# Patient Record
Sex: MALE | Race: WHITE | Employment: UNEMPLOYED | URBAN - METROPOLITAN AREA
[De-identification: names, ages, dates, MRNs, and addresses within clinical notes are randomized per-mention and may not be internally consistent; named-entity substitution may affect disease eponyms.]

---

## 2018-05-07 ENCOUNTER — ANESTHESIA (OUTPATIENT)
Dept: CARDIAC CATH/INVASIVE PROCEDURES | Age: 58
DRG: 247 | End: 2018-05-07
Payer: SELF-PAY

## 2018-05-07 ENCOUNTER — HOSPITAL ENCOUNTER (INPATIENT)
Age: 58
LOS: 3 days | Discharge: HOME | DRG: 247 | End: 2018-05-10
Attending: EMERGENCY MEDICINE | Admitting: HOSPITALIST
Payer: SELF-PAY

## 2018-05-07 ENCOUNTER — ANESTHESIA EVENT (OUTPATIENT)
Dept: CARDIAC CATH/INVASIVE PROCEDURES | Age: 58
DRG: 247 | End: 2018-05-07
Payer: SELF-PAY

## 2018-05-07 DIAGNOSIS — I21.09 ST ELEVATION MYOCARDIAL INFARCTION (STEMI) OF ANTERIOR WALL (HCC): Primary | ICD-10-CM

## 2018-05-07 PROBLEM — I21.3 STEMI (ST ELEVATION MYOCARDIAL INFARCTION) (HCC): Status: ACTIVE | Noted: 2018-05-07

## 2018-05-07 PROBLEM — I21.3 STEMI (ST ELEVATION MYOCARDIAL INFARCTION) (HCC): Chronic | Status: ACTIVE | Noted: 2018-05-07

## 2018-05-07 LAB
ACT BLD: 114 SECS (ref 79–138)
ACT BLD: 158 SECS (ref 79–138)
ACT BLD: 401 SECS (ref 79–138)
ANION GAP BLD CALC-SCNC: 16 MMOL/L (ref 10–20)
ANION GAP SERPL CALC-SCNC: 5 MMOL/L (ref 3–18)
ANION GAP SERPL CALC-SCNC: 5 MMOL/L (ref 3–18)
ARTERIAL PATENCY WRIST A: ABNORMAL
ATRIAL RATE: 88 BPM
ATRIAL RATE: 98 BPM
BASE DEFICIT BLD-SCNC: 2 MMOL/L
BASOPHILS # BLD: 0.2 K/UL (ref 0–0.06)
BASOPHILS NFR BLD: 1 % (ref 0–3)
BDY SITE: ABNORMAL
BUN BLD-MCNC: 17 MG/DL (ref 7–18)
BUN SERPL-MCNC: 16 MG/DL (ref 7–18)
BUN SERPL-MCNC: 16 MG/DL (ref 7–18)
BUN/CREAT SERPL: 16 (ref 12–20)
BUN/CREAT SERPL: 20 (ref 12–20)
CA-I BLD-MCNC: 1.18 MMOL/L (ref 1.12–1.32)
CA-I BLD-MCNC: 1.21 MMOL/L (ref 1.12–1.32)
CALCIUM SERPL-MCNC: 8.4 MG/DL (ref 8.5–10.1)
CALCIUM SERPL-MCNC: 9.1 MG/DL (ref 8.5–10.1)
CALCULATED P AXIS, ECG09: 70 DEGREES
CALCULATED P AXIS, ECG09: 73 DEGREES
CALCULATED R AXIS, ECG10: 23 DEGREES
CALCULATED R AXIS, ECG10: 55 DEGREES
CALCULATED T AXIS, ECG11: 33 DEGREES
CALCULATED T AXIS, ECG11: 69 DEGREES
CHLORIDE BLD-SCNC: 100 MMOL/L (ref 100–108)
CHLORIDE SERPL-SCNC: 106 MMOL/L (ref 100–108)
CHLORIDE SERPL-SCNC: 108 MMOL/L (ref 100–108)
CO2 BLD-SCNC: 28 MMOL/L (ref 19–24)
CO2 SERPL-SCNC: 25 MMOL/L (ref 21–32)
CO2 SERPL-SCNC: 28 MMOL/L (ref 21–32)
CREAT SERPL-MCNC: 0.82 MG/DL (ref 0.6–1.3)
CREAT SERPL-MCNC: 0.98 MG/DL (ref 0.6–1.3)
CREAT UR-MCNC: 1 MG/DL (ref 0.6–1.3)
DIAGNOSIS, 93000: NORMAL
DIAGNOSIS, 93000: NORMAL
DIFFERENTIAL METHOD BLD: ABNORMAL
EOSINOPHIL # BLD: 0.3 K/UL (ref 0–0.4)
EOSINOPHIL NFR BLD: 2 % (ref 0–5)
ERYTHROCYTE [DISTWIDTH] IN BLOOD BY AUTOMATED COUNT: 13.2 % (ref 11.6–14.5)
ERYTHROCYTE [DISTWIDTH] IN BLOOD BY AUTOMATED COUNT: 13.3 % (ref 11.6–14.5)
GAS FLOW.O2 O2 DELIVERY SYS: ABNORMAL L/MIN
GLUCOSE BLD STRIP.AUTO-MCNC: 176 MG/DL (ref 74–106)
GLUCOSE BLD STRIP.AUTO-MCNC: 87 MG/DL (ref 74–106)
GLUCOSE SERPL-MCNC: 147 MG/DL (ref 74–99)
GLUCOSE SERPL-MCNC: 86 MG/DL (ref 74–99)
HCO3 BLD-SCNC: 23.6 MMOL/L (ref 22–26)
HCT VFR BLD AUTO: 39.5 % (ref 36–48)
HCT VFR BLD AUTO: 43.2 % (ref 36–48)
HCT VFR BLD CALC: 39 % (ref 36–49)
HCT VFR BLD CALC: 45 % (ref 36–49)
HGB BLD-MCNC: 13.3 G/DL (ref 12–16)
HGB BLD-MCNC: 14 G/DL (ref 13–16)
HGB BLD-MCNC: 15.3 G/DL (ref 12–16)
HGB BLD-MCNC: 15.3 G/DL (ref 13–16)
LYMPHOCYTES # BLD: 5.6 K/UL (ref 0.8–3.5)
LYMPHOCYTES NFR BLD: 33 % (ref 20–51)
MCH RBC QN AUTO: 29.9 PG (ref 24–34)
MCH RBC QN AUTO: 30.4 PG (ref 24–34)
MCHC RBC AUTO-ENTMCNC: 35.4 G/DL (ref 31–37)
MCHC RBC AUTO-ENTMCNC: 35.4 G/DL (ref 31–37)
MCV RBC AUTO: 84.2 FL (ref 74–97)
MCV RBC AUTO: 85.9 FL (ref 74–97)
MONOCYTES # BLD: 2.6 K/UL (ref 0–1)
MONOCYTES NFR BLD: 15 % (ref 2–9)
NEUTS SEG # BLD: 8.4 K/UL (ref 1.8–8)
NEUTS SEG NFR BLD: 49 % (ref 42–75)
O2/TOTAL GAS SETTING VFR VENT: 40 %
P-R INTERVAL, ECG05: 126 MS
P-R INTERVAL, ECG05: 144 MS
PCO2 BLD: 42.4 MMHG (ref 35–45)
PEEP RESPIRATORY: 5 CMH2O
PH BLD: 7.35 [PH] (ref 7.35–7.45)
PLATELET # BLD AUTO: 265 K/UL (ref 135–420)
PLATELET # BLD AUTO: 280 K/UL (ref 135–420)
PLATELET COMMENTS,PCOM: ABNORMAL
PMV BLD AUTO: 10.7 FL (ref 9.2–11.8)
PMV BLD AUTO: 10.9 FL (ref 9.2–11.8)
PO2 BLD: 70 MMHG (ref 80–100)
POTASSIUM BLD-SCNC: 3.2 MMOL/L (ref 3.5–5.5)
POTASSIUM BLD-SCNC: 3.9 MMOL/L (ref 3.5–5.5)
POTASSIUM SERPL-SCNC: 3.2 MMOL/L (ref 3.5–5.5)
POTASSIUM SERPL-SCNC: 3.7 MMOL/L (ref 3.5–5.5)
PRESSURE SUPPORT SETTING VENT: 7 CMH2O
Q-T INTERVAL, ECG07: 302 MS
Q-T INTERVAL, ECG07: 360 MS
QRS DURATION, ECG06: 76 MS
QRS DURATION, ECG06: 90 MS
QTC CALCULATION (BEZET), ECG08: 385 MS
QTC CALCULATION (BEZET), ECG08: 435 MS
RBC # BLD AUTO: 4.69 M/UL (ref 4.7–5.5)
RBC # BLD AUTO: 5.03 M/UL (ref 4.7–5.5)
RBC MORPH BLD: ABNORMAL
SAO2 % BLD: 93 % (ref 92–97)
SERVICE CMNT-IMP: ABNORMAL
SODIUM BLD-SCNC: 137 MMOL/L (ref 136–145)
SODIUM BLD-SCNC: 141 MMOL/L (ref 136–145)
SODIUM SERPL-SCNC: 138 MMOL/L (ref 136–145)
SODIUM SERPL-SCNC: 139 MMOL/L (ref 136–145)
SPECIMEN TYPE: ABNORMAL
TOTAL RESP. RATE, ITRR: 24
TROPONIN I SERPL-MCNC: 146 NG/ML (ref 0–0.04)
TROPONIN I SERPL-MCNC: 3.73 NG/ML (ref 0–0.04)
VENTILATION MODE VENT: ABNORMAL
VENTRICULAR RATE, ECG03: 88 BPM
VENTRICULAR RATE, ECG03: 98 BPM
WBC # BLD AUTO: 15.8 K/UL (ref 4.6–13.2)
WBC # BLD AUTO: 17.1 K/UL (ref 4.6–13.2)

## 2018-05-07 PROCEDURE — 85025 COMPLETE CBC W/AUTO DIFF WBC: CPT | Performed by: EMERGENCY MEDICINE

## 2018-05-07 PROCEDURE — 85027 COMPLETE CBC AUTOMATED: CPT | Performed by: INTERNAL MEDICINE

## 2018-05-07 PROCEDURE — 80048 BASIC METABOLIC PNL TOTAL CA: CPT | Performed by: EMERGENCY MEDICINE

## 2018-05-07 PROCEDURE — 80047 BASIC METABLC PNL IONIZED CA: CPT

## 2018-05-07 PROCEDURE — 99285 EMERGENCY DEPT VISIT HI MDM: CPT

## 2018-05-07 PROCEDURE — B2151ZZ FLUOROSCOPY OF LEFT HEART USING LOW OSMOLAR CONTRAST: ICD-10-PCS | Performed by: INTERNAL MEDICINE

## 2018-05-07 PROCEDURE — 74011250637 HC RX REV CODE- 250/637: Performed by: INTERNAL MEDICINE

## 2018-05-07 PROCEDURE — 0BH17EZ INSERTION OF ENDOTRACHEAL AIRWAY INTO TRACHEA, VIA NATURAL OR ARTIFICIAL OPENING: ICD-10-PCS | Performed by: ANESTHESIOLOGY

## 2018-05-07 PROCEDURE — 82330 ASSAY OF CALCIUM: CPT

## 2018-05-07 PROCEDURE — 027034Z DILATION OF CORONARY ARTERY, ONE ARTERY WITH DRUG-ELUTING INTRALUMINAL DEVICE, PERCUTANEOUS APPROACH: ICD-10-PCS | Performed by: INTERNAL MEDICINE

## 2018-05-07 PROCEDURE — 84484 ASSAY OF TROPONIN QUANT: CPT | Performed by: EMERGENCY MEDICINE

## 2018-05-07 PROCEDURE — 31500 INSERT EMERGENCY AIRWAY: CPT

## 2018-05-07 PROCEDURE — 74011250637 HC RX REV CODE- 250/637: Performed by: HOSPITALIST

## 2018-05-07 PROCEDURE — 93005 ELECTROCARDIOGRAM TRACING: CPT

## 2018-05-07 PROCEDURE — 74011000250 HC RX REV CODE- 250: Performed by: INTERNAL MEDICINE

## 2018-05-07 PROCEDURE — 4A023N7 MEASUREMENT OF CARDIAC SAMPLING AND PRESSURE, LEFT HEART, PERCUTANEOUS APPROACH: ICD-10-PCS | Performed by: INTERNAL MEDICINE

## 2018-05-07 PROCEDURE — 74011250636 HC RX REV CODE- 250/636: Performed by: INTERNAL MEDICINE

## 2018-05-07 PROCEDURE — 85347 COAGULATION TIME ACTIVATED: CPT

## 2018-05-07 PROCEDURE — 74011250636 HC RX REV CODE- 250/636: Performed by: EMERGENCY MEDICINE

## 2018-05-07 PROCEDURE — 74011000250 HC RX REV CODE- 250

## 2018-05-07 PROCEDURE — B2111ZZ FLUOROSCOPY OF MULTIPLE CORONARY ARTERIES USING LOW OSMOLAR CONTRAST: ICD-10-PCS | Performed by: INTERNAL MEDICINE

## 2018-05-07 PROCEDURE — 82803 BLOOD GASES ANY COMBINATION: CPT

## 2018-05-07 PROCEDURE — 94002 VENT MGMT INPAT INIT DAY: CPT

## 2018-05-07 PROCEDURE — 77030013797 CARDIAC CATHETERIZATION

## 2018-05-07 PROCEDURE — 77030008683 HC TU ET CUF COVD -A: Performed by: NURSE ANESTHETIST, CERTIFIED REGISTERED

## 2018-05-07 PROCEDURE — 77030026438 HC STYL ET INTUB CARD -A: Performed by: NURSE ANESTHETIST, CERTIFIED REGISTERED

## 2018-05-07 PROCEDURE — 80048 BASIC METABOLIC PNL TOTAL CA: CPT | Performed by: INTERNAL MEDICINE

## 2018-05-07 PROCEDURE — 65610000006 HC RM INTENSIVE CARE

## 2018-05-07 PROCEDURE — 74011250636 HC RX REV CODE- 250/636

## 2018-05-07 PROCEDURE — 74011636320 HC RX REV CODE- 636/320: Performed by: INTERNAL MEDICINE

## 2018-05-07 PROCEDURE — 5A1935Z RESPIRATORY VENTILATION, LESS THAN 24 CONSECUTIVE HOURS: ICD-10-PCS | Performed by: ANESTHESIOLOGY

## 2018-05-07 PROCEDURE — 36415 COLL VENOUS BLD VENIPUNCTURE: CPT | Performed by: INTERNAL MEDICINE

## 2018-05-07 RX ORDER — NITROGLYCERIN 0.4 MG/1
0.4 TABLET SUBLINGUAL AS NEEDED
Status: DISCONTINUED | OUTPATIENT
Start: 2018-05-07 | End: 2018-05-10 | Stop reason: HOSPADM

## 2018-05-07 RX ORDER — HEPARIN SODIUM 5000 [USP'U]/ML
4000 INJECTION, SOLUTION INTRAVENOUS; SUBCUTANEOUS
Status: ACTIVE | OUTPATIENT
Start: 2018-05-07 | End: 2018-05-07

## 2018-05-07 RX ORDER — SODIUM CHLORIDE 0.9 % (FLUSH) 0.9 %
5-10 SYRINGE (ML) INJECTION EVERY 8 HOURS
Status: DISCONTINUED | OUTPATIENT
Start: 2018-05-07 | End: 2018-05-10 | Stop reason: HOSPADM

## 2018-05-07 RX ORDER — EPTIFIBATIDE 2 MG/ML
INJECTION, SOLUTION INTRAVENOUS
Status: DISPENSED
Start: 2018-05-07 | End: 2018-05-07

## 2018-05-07 RX ORDER — EPTIFIBATIDE 2 MG/ML
180 INJECTION, SOLUTION INTRAVENOUS ONCE
Status: COMPLETED | OUTPATIENT
Start: 2018-05-07 | End: 2018-05-07

## 2018-05-07 RX ORDER — CEFAZOLIN SODIUM 2 G/50ML
2 SOLUTION INTRAVENOUS ONCE
Status: COMPLETED | OUTPATIENT
Start: 2018-05-07 | End: 2018-05-07

## 2018-05-07 RX ORDER — GUAIFENESIN 100 MG/5ML
81 LIQUID (ML) ORAL DAILY
Status: DISCONTINUED | OUTPATIENT
Start: 2018-05-08 | End: 2018-05-10 | Stop reason: HOSPADM

## 2018-05-07 RX ORDER — HEPARIN SODIUM 200 [USP'U]/100ML
1000 INJECTION, SOLUTION INTRAVENOUS ONCE
Status: COMPLETED | OUTPATIENT
Start: 2018-05-07 | End: 2018-05-07

## 2018-05-07 RX ORDER — MORPHINE SULFATE 4 MG/ML
4 INJECTION INTRAVENOUS
Status: ACTIVE | OUTPATIENT
Start: 2018-05-07 | End: 2018-05-07

## 2018-05-07 RX ORDER — ONDANSETRON 2 MG/ML
4 INJECTION INTRAMUSCULAR; INTRAVENOUS ONCE
Status: COMPLETED | OUTPATIENT
Start: 2018-05-07 | End: 2018-05-07

## 2018-05-07 RX ORDER — HEPARIN SODIUM 1000 [USP'U]/ML
1000-10000 INJECTION, SOLUTION INTRAVENOUS; SUBCUTANEOUS
Status: DISCONTINUED | OUTPATIENT
Start: 2018-05-07 | End: 2018-05-07 | Stop reason: HOSPADM

## 2018-05-07 RX ORDER — ACETAMINOPHEN 325 MG/1
650 TABLET ORAL
Status: DISCONTINUED | OUTPATIENT
Start: 2018-05-07 | End: 2018-05-10 | Stop reason: HOSPADM

## 2018-05-07 RX ORDER — SODIUM CHLORIDE 0.9 % (FLUSH) 0.9 %
5-10 SYRINGE (ML) INJECTION AS NEEDED
Status: DISCONTINUED | OUTPATIENT
Start: 2018-05-07 | End: 2018-05-10 | Stop reason: HOSPADM

## 2018-05-07 RX ORDER — MIDAZOLAM HYDROCHLORIDE 1 MG/ML
INJECTION, SOLUTION INTRAMUSCULAR; INTRAVENOUS
Status: COMPLETED
Start: 2018-05-07 | End: 2018-05-07

## 2018-05-07 RX ORDER — METOPROLOL TARTRATE 5 MG/5ML
INJECTION INTRAVENOUS
Status: COMPLETED
Start: 2018-05-07 | End: 2018-05-07

## 2018-05-07 RX ORDER — MIDAZOLAM HYDROCHLORIDE 1 MG/ML
INJECTION, SOLUTION INTRAMUSCULAR; INTRAVENOUS
Status: DISPENSED
Start: 2018-05-07 | End: 2018-05-08

## 2018-05-07 RX ORDER — GUAIFENESIN 100 MG/5ML
LIQUID (ML) ORAL
Status: DISPENSED
Start: 2018-05-07 | End: 2018-05-07

## 2018-05-07 RX ORDER — HEPARIN SODIUM 1000 [USP'U]/ML
INJECTION, SOLUTION INTRAVENOUS; SUBCUTANEOUS
Status: COMPLETED
Start: 2018-05-07 | End: 2018-05-07

## 2018-05-07 RX ORDER — FENTANYL CITRATE 50 UG/ML
25-100 INJECTION, SOLUTION INTRAMUSCULAR; INTRAVENOUS
Status: DISCONTINUED | OUTPATIENT
Start: 2018-05-07 | End: 2018-05-07 | Stop reason: HOSPADM

## 2018-05-07 RX ORDER — MIDAZOLAM HYDROCHLORIDE 1 MG/ML
1-4 INJECTION, SOLUTION INTRAMUSCULAR; INTRAVENOUS
Status: DISCONTINUED | OUTPATIENT
Start: 2018-05-07 | End: 2018-05-07 | Stop reason: HOSPADM

## 2018-05-07 RX ORDER — VERAPAMIL HYDROCHLORIDE 2.5 MG/ML
5 INJECTION, SOLUTION INTRAVENOUS ONCE
Status: DISCONTINUED | OUTPATIENT
Start: 2018-05-07 | End: 2018-05-07 | Stop reason: HOSPADM

## 2018-05-07 RX ORDER — ATORVASTATIN CALCIUM 40 MG/1
80 TABLET, FILM COATED ORAL
Status: DISCONTINUED | OUTPATIENT
Start: 2018-05-07 | End: 2018-05-10 | Stop reason: HOSPADM

## 2018-05-07 RX ORDER — EPTIFIBATIDE 0.75 MG/ML
2 INJECTION, SOLUTION INTRAVENOUS CONTINUOUS
Status: DISCONTINUED | OUTPATIENT
Start: 2018-05-07 | End: 2018-05-07

## 2018-05-07 RX ORDER — ONDANSETRON 2 MG/ML
INJECTION INTRAMUSCULAR; INTRAVENOUS
Status: DISPENSED
Start: 2018-05-07 | End: 2018-05-07

## 2018-05-07 RX ORDER — LIDOCAINE HYDROCHLORIDE 10 MG/ML
1-30 INJECTION, SOLUTION EPIDURAL; INFILTRATION; INTRACAUDAL; PERINEURAL
Status: DISCONTINUED | OUTPATIENT
Start: 2018-05-07 | End: 2018-05-07 | Stop reason: HOSPADM

## 2018-05-07 RX ORDER — NITROGLYCERIN 0.4 MG/1
TABLET SUBLINGUAL
Status: DISPENSED
Start: 2018-05-07 | End: 2018-05-07

## 2018-05-07 RX ORDER — IODIXANOL 320 MG/ML
50-150 INJECTION, SOLUTION INTRAVASCULAR
Status: DISCONTINUED | OUTPATIENT
Start: 2018-05-07 | End: 2018-05-07 | Stop reason: HOSPADM

## 2018-05-07 RX ORDER — METOPROLOL TARTRATE 5 MG/5ML
2.5 INJECTION INTRAVENOUS ONCE
Status: COMPLETED | OUTPATIENT
Start: 2018-05-07 | End: 2018-05-07

## 2018-05-07 RX ORDER — MIDAZOLAM HYDROCHLORIDE 1 MG/ML
2 INJECTION, SOLUTION INTRAMUSCULAR; INTRAVENOUS ONCE
Status: COMPLETED | OUTPATIENT
Start: 2018-05-07 | End: 2018-05-07

## 2018-05-07 RX ORDER — ONDANSETRON 2 MG/ML
4 INJECTION INTRAMUSCULAR; INTRAVENOUS
Status: COMPLETED | OUTPATIENT
Start: 2018-05-07 | End: 2018-05-07

## 2018-05-07 RX ORDER — ONDANSETRON 2 MG/ML
INJECTION INTRAMUSCULAR; INTRAVENOUS
Status: COMPLETED
Start: 2018-05-07 | End: 2018-05-07

## 2018-05-07 RX ORDER — ONDANSETRON 2 MG/ML
4 INJECTION INTRAMUSCULAR; INTRAVENOUS
Status: DISCONTINUED | OUTPATIENT
Start: 2018-05-07 | End: 2018-05-10 | Stop reason: HOSPADM

## 2018-05-07 RX ADMIN — METOPROLOL TARTRATE 2.5 MG: 5 INJECTION INTRAVENOUS at 12:30

## 2018-05-07 RX ADMIN — MIDAZOLAM HYDROCHLORIDE 1 MG: 1 INJECTION, SOLUTION INTRAMUSCULAR; INTRAVENOUS at 12:42

## 2018-05-07 RX ADMIN — ACETAMINOPHEN 650 MG: 325 TABLET ORAL at 20:39

## 2018-05-07 RX ADMIN — FENTANYL CITRATE 25 MCG: 50 INJECTION INTRAMUSCULAR; INTRAVENOUS at 11:56

## 2018-05-07 RX ADMIN — MIDAZOLAM HYDROCHLORIDE 1 MG: 1 INJECTION, SOLUTION INTRAMUSCULAR; INTRAVENOUS at 11:27

## 2018-05-07 RX ADMIN — HEPARIN SODIUM 4000 UNITS: 1000 INJECTION, SOLUTION INTRAVENOUS; SUBCUTANEOUS at 11:11

## 2018-05-07 RX ADMIN — Medication 10 ML: at 14:27

## 2018-05-07 RX ADMIN — LIDOCAINE HYDROCHLORIDE 3 ML: 10 INJECTION, SOLUTION EPIDURAL; INFILTRATION; INTRACAUDAL; PERINEURAL at 11:27

## 2018-05-07 RX ADMIN — ATORVASTATIN CALCIUM 80 MG: 40 TABLET, FILM COATED ORAL at 22:14

## 2018-05-07 RX ADMIN — CEFAZOLIN SODIUM 2 G: 2 SOLUTION INTRAVENOUS at 20:10

## 2018-05-07 RX ADMIN — HEPARIN SODIUM 3000 UNITS: 1000 INJECTION, SOLUTION INTRAVENOUS; SUBCUTANEOUS at 11:44

## 2018-05-07 RX ADMIN — MIDAZOLAM HYDROCHLORIDE 1 MG: 1 INJECTION, SOLUTION INTRAMUSCULAR; INTRAVENOUS at 12:58

## 2018-05-07 RX ADMIN — FENTANYL CITRATE 25 MCG: 50 INJECTION INTRAMUSCULAR; INTRAVENOUS at 11:26

## 2018-05-07 RX ADMIN — EPTIFIBATIDE 9.8 MG: 2 INJECTION, SOLUTION INTRAVENOUS at 11:51

## 2018-05-07 RX ADMIN — MIDAZOLAM HYDROCHLORIDE 2 MG: 1 INJECTION, SOLUTION INTRAMUSCULAR; INTRAVENOUS at 12:48

## 2018-05-07 RX ADMIN — MIDAZOLAM HYDROCHLORIDE 2 MG: 1 INJECTION, SOLUTION INTRAMUSCULAR; INTRAVENOUS at 13:15

## 2018-05-07 RX ADMIN — ONDANSETRON 2 MG: 2 INJECTION INTRAMUSCULAR; INTRAVENOUS at 11:57

## 2018-05-07 RX ADMIN — Medication 10 ML: at 22:15

## 2018-05-07 RX ADMIN — ONDANSETRON 4 MG: 2 INJECTION INTRAMUSCULAR; INTRAVENOUS at 11:16

## 2018-05-07 RX ADMIN — NITROGLYCERIN 100 MCG: 5 INJECTION, SOLUTION INTRAVENOUS at 12:39

## 2018-05-07 RX ADMIN — Medication 1000 UNITS: at 11:11

## 2018-05-07 RX ADMIN — MIDAZOLAM HYDROCHLORIDE 1 MG: 1 INJECTION, SOLUTION INTRAMUSCULAR; INTRAVENOUS at 11:56

## 2018-05-07 RX ADMIN — EPTIFIBATIDE 9.8 MG: 2 INJECTION, SOLUTION INTRAVENOUS at 12:02

## 2018-05-07 RX ADMIN — ONDANSETRON 2 MG: 2 INJECTION INTRAMUSCULAR; INTRAVENOUS at 12:10

## 2018-05-07 RX ADMIN — FENTANYL CITRATE 50 MCG: 50 INJECTION INTRAMUSCULAR; INTRAVENOUS at 12:03

## 2018-05-07 RX ADMIN — TICAGRELOR 180 MG: 90 TABLET ORAL at 13:21

## 2018-05-07 RX ADMIN — IOPAMIDOL 155 ML: 612 INJECTION, SOLUTION INTRAVENOUS at 12:44

## 2018-05-07 RX ADMIN — METOPROLOL TARTRATE 2.5 MG: 5 INJECTION, SOLUTION INTRAVENOUS at 12:30

## 2018-05-07 NOTE — CONSULTS
[facility] - Consult Note    Date of  Admission: 5/7/2018 11:04 AM     Primary Care Physician:  No primary care provider on file. Plan:     1) acute anterior wall MI- ekg reveals ST elevation in anterior leads. On going chest pain. No obvious contraindication for cath. Onset of chest pain was 2 days ago with on and off symptoms. Steady pain about 30mins prior to calling ems. 2) smoker  3) htn    Will proceed with emergent cardiac cath. Assessment:     Hospital Problems  Never Reviewed          Codes Class Noted POA    STEMI (ST elevation myocardial infarction) ([facility] [address] (Chronic) ICD-10-CM: I21.3  ICD-9-CM: 410.90  5/7/2018 Unknown                   History of Present Illness: This patient has been seen and evaluated at the request of  for STEMI      This is a 59 y.o. male admitted for STEMI (ST elevation myocardial infarction) ([facility] [address] Patient complains of:        Patient is a 59 yr old male presented with a history of severe chest pain. Patient c/o chest pain for the past 2 days - on and off - left sided heaviness lasting several mins -- today he had sever chest pain about 30 mins prior to calling ems. No palpitations. No orthopnea. No cough. No pnd. No dizziness or lightheadedness. No fever or chills. No diaphoresis. No leg swelling. No recent syncopal episodes. No blood in stool or urine. No nausea or vomit. No recent CVA. Cardiac risk factors: htn, smoker         Past Medical History:   No past medical history on file.       Social History:     Social History     Social History    Marital status: N/A     Spouse name: N/A    Number of children: N/A    Years of education: N/A     Social History Main Topics    Smoking status: Not on file    Smokeless tobacco: Not on file    Alcohol use Not on file    Drug use: Not on file    Sexual activity: Not on file     Other Topics Concern    Not on file     Social History Narrative        Family History:   No family history on file. Medications:   No Known Allergies     Current Facility-Administered Medications   Medication Dose Route Frequency    nitroglycerin (NITROSTAT) tablet 0.4 mg  0.4 mg SubLINGual PRN    heparin (porcine) injection 4,000 Units  4,000 Units IntraVENous NOW    aspirin 81 mg chewable tablet        aspirin 81 mg chewable tablet        nitroglycerin (NITROSTAT) 0.4 mg tablet        nitroglycerine compounded injection 100-200 mcg  100-200 mcg IntraCORONary Multiple    heparin (porcine) 1,000 unit/mL injection 1,000-10,000 Units  1,000-10,000 Units IntraVENous Multiple    lidocaine (PF) (XYLOCAINE) 10 mg/mL (1 %) injection 1-30 mL  1-30 mL SubCUTAneous Multiple    iodixanol (VISIPAQUE) 320 mg iodine/mL contrast injection  mL   mL IntraVENous Multiple    fentaNYL citrate (PF) injection  mcg   mcg IntraVENous Multiple    midazolam (VERSED) injection 1-4 mg  1-4 mg IntraVENous Multiple    verapamil (ISOPTIN) 2.5 mg/mL injection 5 mg  5 mg IntraarTERial ONCE    morphine injection 4 mg  4 mg IntraVENous NOW    nitroglycerine compounded injection        eptifibatide (INTEGRILIN) 2 mg/mL injection        ondansetron (ZOFRAN) 4 mg/2 mL injection        ticagrelor (BRILINTA) tablet 180 mg  180 mg Oral NOW        Review Of Systems:       A comprehensive review of systems was negative except for that written in the HPI.     Constitutional: No fever, no chills, no weight loss, no night sweats   HEENT: No epistaxis, no nasal drainage, no difficulty in swallowing, no redness in eyes  Respiratory: negative for cough, sputum, hemoptysis,  wheezing, dyspnea on exertion or emphysema  Cardiovascular:  no pnd, no claudication no palpitations, no chronic leg edema, no syncope  Gastrointestinal: no abd pain, no vomiting, no diarrhea, no bleeding symptoms  Genitourinary: No urinary symptoms or hematuria  Integument/breast: No ulcers or rashes  Musculoskeletal: no muscle pain, no weakness  Neurological: No focal weakness, no seizures, no headaches  Behvioral/Psych: No anxiety, no depression             Physical Exam:     Visit Vitals    /85    Pulse 81    Temp 98.3 °F (36.8 °C)    Resp 16    Ht 5' 2\" (1.575 m)    Wt 54.4 kg (120 lb)    BMI 21.95 kg/m2     BP Readings from Last 3 Encounters:   05/07/18 127/85     Pulse Readings from Last 3 Encounters:   05/07/18 81     Wt Readings from Last 3 Encounters:   05/07/18 54.4 kg (120 lb)       General:  alert, cooperative, appears stated age, with moderate chest pain  Skin: Warm and dry, acyanotic, normal color. Head: Normocephalic, atraumatic. Eyes: Sclerae anicteric, conjunctivae without injection. Neck:  nontender, no nuchal rigidity, no masses, no stridor, no carotid bruit, no JVD  Lungs:  clear to auscultation bilaterally, no rhonchi   Heart:  regular rate and rhythm, S1, S2 normal, no murmur, click, rub or gallop  Abdomen:  abdomen is soft without significant tenderness, masses, organomegaly or guarding  Extremities:  extremities normal, atraumatic, no cyanosis or edema  Neurological: grossly intact. No focal abnormalities, moves all extremities well. Psychiatric Affect: The patient is awake, alert and oriented x3. Sydni Gaunt is interactive and appropriate. Data Review:     Recent Results (from the past 48 hour(s))   CBC WITH AUTOMATED DIFF    Collection Time: 05/07/18 10:50 AM   Result Value Ref Range    WBC 17.1 (H) 4.6 - 13.2 K/uL    RBC 5.03 4.70 - 5.50 M/uL    HGB 15.3 13.0 - 16.0 g/dL    HCT 43.2 36.0 - 48.0 %    MCV 85.9 74.0 - 97.0 FL    MCH 30.4 24.0 - 34.0 PG    MCHC 35.4 31.0 - 37.0 g/dL    RDW 13.3 11.6 - 14.5 %    PLATELET 699 775 - 672 K/uL    MPV 10.7 9.2 - 11.8 FL    NEUTROPHILS 49 42 - 75 %    LYMPHOCYTES 33 20 - 51 %    MONOCYTES 15 (H) 2 - 9 %    EOSINOPHILS 2 0 - 5 %    BASOPHILS 1 0 - 3 %    ABS. NEUTROPHILS 8.4 (H) 1.8 - 8.0 K/UL    ABS. LYMPHOCYTES 5.6 (H) 0.8 - 3.5 K/UL    ABS.  MONOCYTES 2.6 (H) 0 - 1.0 K/UL    ABS. EOSINOPHILS 0.3 0.0 - 0.4 K/UL    ABS.  BASOPHILS 0.2 (H) 0.0 - 0.06 K/UL    DF MANUAL      PLATELET COMMENTS ADEQUATE PLATELETS      RBC COMMENTS NORMOCYTIC, NORMOCHROMIC     METABOLIC PANEL, BASIC    Collection Time: 05/07/18 10:50 AM   Result Value Ref Range    Sodium 139 136 - 145 mmol/L    Potassium 3.2 (L) 3.5 - 5.5 mmol/L    Chloride 106 100 - 108 mmol/L    CO2 28 21 - 32 mmol/L    Anion gap 5 3.0 - 18 mmol/L    Glucose 86 74 - 99 mg/dL    BUN 16 7.0 - 18 MG/DL    Creatinine 0.98 0.6 - 1.3 MG/DL    BUN/Creatinine ratio 16 12 - 20      GFR est AA >60 >60 ml/min/1.73m2    GFR est non-AA >60 >60 ml/min/1.73m2    Calcium 9.1 8.5 - 10.1 MG/DL   TROPONIN I    Collection Time: 05/07/18 10:50 AM   Result Value Ref Range    Troponin-I, Qt. 3.73 (HH) 0.0 - 0.045 NG/ML   EKG, 12 LEAD, INITIAL    Collection Time: 05/07/18 11:04 AM   Result Value Ref Range    Ventricular Rate 88 BPM    Atrial Rate 88 BPM    P-R Interval 126 ms    QRS Duration 76 ms    Q-T Interval 360 ms    QTC Calculation (Bezet) 435 ms    Calculated P Axis 73 degrees    Calculated R Axis 23 degrees    Calculated T Axis 33 degrees    Diagnosis       Normal sinus rhythm  Anteroseptal infarct , possibly acute  Inferolateral injury pattern  ACUTE MI  Abnormal ECG  No previous ECGs available     POC CHEM8    Collection Time: 05/07/18 11:06 AM   Result Value Ref Range    CO2, POC 28 (H) 19 - 24 MMOL/L    Glucose, POC 87 74 - 106 MG/DL    BUN, POC 17 7 - 18 MG/DL    Creatinine, POC 1.0 0.6 - 1.3 MG/DL    GFRAA, POC >60 >60 ml/min/1.73m2    GFRNA, POC >60 >60 ml/min/1.73m2    Sodium,  136 - 145 MMOL/L    Potassium, POC 3.2 (L) 3.5 - 5.5 MMOL/L    Calcium, ionized (POC) 1.18 1.12 - 1.32 mmol/L    Chloride,  100 - 108 MMOL/L    Anion gap, POC 16 10 - 20      Hematocrit, POC 45 36 - 49 %    Hemoglobin, POC 15.3 12 - 16 G/DL   CBC W/O DIFF    Collection Time: 05/07/18 11:29 AM   Result Value Ref Range    WBC 15.8 (H) 4.6 - 13.2 K/uL    RBC 4.69 (L) 4.70 - 5.50 M/uL    HGB 14.0 13.0 - 16.0 g/dL    HCT 39.5 36.0 - 48.0 %    MCV 84.2 74.0 - 97.0 FL    MCH 29.9 24.0 - 34.0 PG    MCHC 35.4 31.0 - 37.0 g/dL    RDW 13.2 11.6 - 14.5 %    PLATELET 332 753 - 107 K/uL    MPV 10.9 9.2 - 04.4 FL   METABOLIC PANEL, BASIC    Collection Time: 05/07/18 11:29 AM   Result Value Ref Range    Sodium 138 136 - 145 mmol/L    Potassium 3.7 3.5 - 5.5 mmol/L    Chloride 108 100 - 108 mmol/L    CO2 25 21 - 32 mmol/L    Anion gap 5 3.0 - 18 mmol/L    Glucose 147 (H) 74 - 99 mg/dL    BUN 16 7.0 - 18 MG/DL    Creatinine 0.82 0.6 - 1.3 MG/DL    BUN/Creatinine ratio 20 12 - 20      GFR est AA >60 >60 ml/min/1.73m2    GFR est non-AA >60 >60 ml/min/1.73m2    Calcium 8.4 (L) 8.5 - 10.1 MG/DL       No intake or output data in the 24 hours ending 05/07/18 1301    Cardiographics:     ECG: sinus tachycardia, ST elevation in anterior leads    Echocardiogram: Not done      Signed By: Delfino Lee MD     May 7, 2018

## 2018-05-07 NOTE — ANESTHESIA PROCEDURE NOTES
Emergent Intubation  Performed by: Skoodat  Authorized by: Skoodat     Emergent Intubation:   Location:  ICU  Date/Time:  5/7/2018 12:16 PM  Indications:  Impending airway compromise  Spontaneous Ventilation: present    Level of Consciousness: sedated  Preoxygenated: Yes      Airway Documentation:   Airway:  ETT - Cuffed  Technique:  Rapid sequence  Blade Type:  Benita  Blade Size:  3  ETT size (mm):  7.5  ETT Line Naeem:  Lips  ETT Insertion depth (cm):  23  Placement verified by: auscultation, EtCO2 and BBS    Attempts:  1  Difficult airway: No    Called for intubation/sedation   Arrived to find patient moving around on cath lab table, not obeying commands. Asked for sedation to assist with procedure. Patient actively vomiting   Suggested to cardiologist to secure airway due to vomiting and inability to safely sedate. Agreed   30mg propofol and 50 mg rocuronium. Dl times one  Mac 3 grade 1 view   7.5 ett at 23 at lip   Etco2, BBS   Teeth lip as pre op.  100%O2 via ambu   ETT tube secured   Care per cardiologists and resp care   No complications noted.

## 2018-05-07 NOTE — ROUTINE PROCESS
Bedside and Verbal shift change report given to Summer Baron RN  (oncoming nurse) by Ethel Key RN  (offgoing nurse). Report included the following information SBAR, Kardex, Procedure Summary, Intake/Output, MAR, Recent Results, Med Rec Status and Cardiac Rhythm SR, ST elevation .

## 2018-05-07 NOTE — IP AVS SNAPSHOT
[address] [place] [address] 
[phone] Patient: Richard Steiner MRN: [id number] EDB:0/25/1871 A check rebecca indicates which time of day the medication should be taken. My Medications START taking these medications Instructions Each Dose to Equal  
 Morning Noon Evening Bedtime  
 aspirin 81 mg chewable tablet Start taking on:  5/11/2018 Take 1 Tab by mouth daily. 81 mg  
    
  
   
   
   
  
 atorvastatin 80 mg tablet Commonly known as:  LIPITOR Take 1 Tab by mouth nightly. 80 mg  
    
   
   
   
  
  
 carvedilol 3.125 mg tablet Commonly known as:  Wendall Lundborg Take 1 Tab by mouth every twelve (12) hours. 3.125 mg  
    
  
   
   
   
  
  
 enalapril 2.5 mg tablet Commonly known as:  Scott Ink Start taking on:  5/11/2018 Take 1 Tab by mouth daily. 2.5 mg  
    
  
   
   
   
  
 ticagrelor 90 mg tablet Commonly known as:  [employer] Start taking on:  5/11/2018 Take 1 Tab by mouth every twelve (12) hours every twelve (12) hours. 90 mg Where to Get Your Medications These medications were sent to [id number] [facility]  [address] [place] [address] Phone:  [phone]  
  aspirin 81 mg chewable tablet  
 atorvastatin 80 mg tablet  
 carvedilol 3.125 mg tablet  
 enalapril 2.5 mg tablet  
 ticagrelor 90 mg tablet

## 2018-05-07 NOTE — IP AVS SNAPSHOT
[facility] 
 
 
 [address] [facility] [address] 
[phone] Patient: Nasreen Mckenzie MRN: [id number] CEI:[id number] About your hospitalization You were admitted on: May 7, 2018 You last received care in the:  [facility] 2 CV STEPDOWN You were discharged on:  May 10, 2018 Why you were hospitalized Your primary diagnosis was:  Not on File Your diagnoses also included:  Stemi (St Elevation Myocardial Infarction) (Hcc), Cardiomyopathy (Hcc), Hypotension Follow-up Information Follow up With Details Comments Contact Info [facility] On 5/17/2018 appt @115 check in @8996 with , please bring with you photo id insurance card and list of  medications [address] [place] [address] 
[phone] Chava Perez MD On 6/1/2018 @262 the address to the office is [address] [place] [address] [address] [place] [address] [facility] [place] [address] 
[phone] None   None (395) Patient stated that they have no PCP Schedule an appointment as soon as possible for a visit in 1 week Schedule an appointment as soon as possible for a visit in 2 weeks Your Scheduled Appointments Thursday May 17, 2018  1:15 PM EDT New Patient with Benjy Riley MD  
[address] [facility] ([address] [place] [address]) 511 [facility] [address] 200 [facility]  
[phone] Friday June 01, 2018  8:45 AM EDT Office Visit with Chava Perez MD  
[facility] ([address] [place] [address]) [address] [facility]  
[phone] Discharge Orders None A check rebecca indicates which time of day the medication should be taken. My Medications START taking these medications Instructions Each Dose to Equal  
 Morning Noon Evening Bedtime aspirin 81 mg chewable tablet Start taking on:  5/11/2018 Take 1 Tab by mouth daily. 81 mg  
    
  
   
   
   
  
 atorvastatin 80 mg tablet Commonly known as:  LIPITOR Take 1 Tab by mouth nightly. 80 mg  
    
   
   
   
  
  
 carvedilol 3.125 mg tablet Commonly known as:  Jeronimo Dole Take 1 Tab by mouth every twelve (12) hours. 3.125 mg  
    
  
   
   
   
  
  
 enalapril 2.5 mg tablet Commonly known as:  Jackie Orange Start taking on:  5/11/2018 Take 1 Tab by mouth daily. 2.5 mg  
    
  
   
   
   
  
 ticagrelor 90 mg tablet Commonly known as:  [employer] Start taking on:  5/11/2018 Take 1 Tab by mouth every twelve (12) hours every twelve (12) hours. 90 mg Where to Get Your Medications These medications were sent to [address], [address] Clem Cardona [address] [facility] [address] Phone:  [phone]  
  aspirin 81 mg chewable tablet  
 atorvastatin 80 mg tablet  
 carvedilol 3.125 mg tablet  
 enalapril 2.5 mg tablet  
 ticagrelor 90 mg tablet Discharge Instructions Discharge Instructions Patient: Vickie Lorenz MRN: [id number]  CSN: [id number] YOB: 1960  Age: 59 y.o. Sex: male DOA: 5/7/2018 LOS:  LOS: 3 days   Discharge Date: DIET:  Cardiac Diet ACTIVITY: Activity as tolerated · H2H for acute MI 
 
ADDITIONAL INFORMATION: If you experience any of the following symptoms but not limited to Fever, chills, nausea, vomiting, diarrhea, change in mentation, falling, bleeding, shortness of breath, chest pain, please call your primary care physician or return to the emergency room if you cannot get hold of your doctor:  
 
FOLLOW UP CARE: 
PCP in 7-10 days. Please call and set up an appointment. Dr. Yumiko Yi, cardio in 2 week Helen Willard MD 
5/10/2018 4:18 PM 
 
 
 
 
  
Heart Attack: Care Instructions Your Care Instructions A heart attack (myocardial infarction, or MI) occurs when one or more of the coronary arteries, which supply the heart with oxygen-rich blood, is blocked. A blockage usually occurs when plaque inside the artery breaks open and a blood clot forms in the artery. After a heart attack, you may be worried about your future. Over the next several weeks, your heart will start to heal. Though it can be hard to break old habits, you can prevent another heart attack by making some lifestyle changes and by taking medicines. You may use this information for ideas about what to do at home to speed your recovery. Follow-up care is a key part of your treatment and safety. Be sure to make and go to all appointments, and call your doctor if you are having problems. It's also a good idea to know your test results and keep a list of the medicines you take. How can you care for yourself at home? Activity ? · Until your doctor says it is okay, do not do strenuous exercise. And do not lift, pull, or push anything heavy. Ask your doctor what types of activities are safe for you. ? · If your doctor has not set you up with a cardiac rehabilitation (rehab) program, talk to him or her about whether that is right for you. Cardiac rehab includes supervised exercise. It also includes help with diet and lifestyle changes and emotional support. It may reduce your risk of future heart problems. ? · Increase your activities slowly. Take short rest breaks when you get tired. ? · If your doctor recommends it, get more exercise. Walking is a good choice. Bit by bit, increase the amount you walk every day. Try for at least 30 minutes on most days of the week. You also may want to swim, bike, or do other activities. Talk with your doctor before you start an exercise program to make sure it is safe for you. ? · Ask your doctor when you can drive, go back to work, and do other daily activities again. ? · You can have sex as soon as you feel ready for it. Often this means when you can easily walk around or climb stairs. Talk with your doctor if you have any concerns. If you are taking nitroglycerin, do not take erection-enhancing medicine such as sildenafil (Viagra), tadalafil (Cialis), or vardenafil (Levitra) . ? Lifestyle changes ? · Do not smoke. Smoking increases your risk of another heart attack. If you need help quitting, talk to your doctor about stop-smoking programs and medicines. These can increase your chances of quitting for good. ? · Eat a heart-healthy diet that is low in saturated fat and salt, and is full of fruits, vegetables and whole-grains. Eat at least two servings of fish each week. You may get more details about how to eat healthy. But these tips can help you get started. ? · Stay at a healthy weight, or lose weight if you need to. Medicines ? · Be safe with medicines. Take your medicines exactly as prescribed. Call your doctor if you think you are having a problem with your medicine. You will get more details on the specific medicines your doctor prescribes. Do not stop taking your medicine unless your doctor tells you to. Not taking your medicine might raise your risk of having another heart attack. ? · You may need several medicines to help lower your risk of another heart attack. These include: ¨ Blood pressure medicines such as angiotensin-converting enzyme (ACE) inhibitors, ARBs (angiotensin II receptor blockers), and beta-blockers. ¨ Cholesterol medicine called statins. ¨ Aspirin and other blood thinners. These prevent blood clots that can cause a heart attack. ? · If your doctor has given you nitroglycerin, keep it with you at all times. If you have angina symptoms, such as chest pain or pressure, sit down and rest. Take the first dose of nitroglycerin as directed.  If symptoms get worse or are not getting better within 5 minutes, call 911 right away. Stay on the phone. The emergency  will tell you what to do. ? · Do not take any over-the-counter medicines, vitamins, or herbal products without talking to your doctor first. ?Staying healthy ? · Manage other health conditions such as high blood pressure and diabetes. ? · Avoid colds and flu. Get a pneumococcal vaccine shot. If you have had one before, ask your doctor whether you need another dose. Get the flu vaccine every year. If you must be around people with colds or flu, wash your hands often. ? · Be sure to tell your doctor about any angina symptoms you have had, even if they went away. Pay attention to your angina symptoms. Know what is typical for you and learn how to control it. Know when to call for help. ? · Talk to your family, friends, or a counselor about your feelings. It is normal to feel frightened, angry, hopeless, helpless, and even guilty. Talking openly about bad feelings can help you cope. If you have symptoms of depression, talk to your doctor. When should you call for help? Call 911 anytime you think you may need emergency care. For example, call if: 
? · You have symptoms of a heart attack. These may include: ¨ Chest pain or pressure, or a strange feeling in the chest. 
¨ Sweating. ¨ Shortness of breath. ¨ Nausea or vomiting. ¨ Pain, pressure, or a strange feeling in the back, neck, jaw, or upper belly or in one or both shoulders or arms. ¨ Lightheadedness or sudden weakness. ¨ A fast or irregular heartbeat. After you call 911, the  may tell you to chew 1 adult-strength or 2 to 4 low-dose aspirin. Wait for an ambulance. Do not try to drive yourself. ? · You have angina symptoms (such as chest pain or pressure) that do not go away with rest or are not getting better within 5 minutes after you take a dose of nitroglycerin. ? · You passed out (lost consciousness). ? · You feel like you are having another heart attack. ?Call your doctor now or seek immediate medical care if: 
? · You are having angina symptoms, such as chest pain or pressure, more often than usual, or the symptoms are different or worse than usual.  
? · You have new or increased shortness of breath. ? · You are dizzy or lightheaded, or you feel like you may faint. ? Watch closely for changes in your health, and be sure to contact your doctor if you have any problems. Where can you learn more? Go to http://corie-radha.info/. Enter [id number] in the search box to learn more about \"Heart Attack: Care Instructions. \" Current as of: September 21, 2016 Content Version: 11.4 © 0542-8876 [employer]. Care instructions adapted under license by [employer] (which disclaims liability or warranty for this information). If you have questions about a medical condition or this instruction, always ask your healthcare professional. [place] [address] any warranty or liability for your use of this information. Reducing Heart Attack Risk With Daily Medicine: Care Instructions Your Care Instructions Heart disease is the number one cause of death. If you are at risk for heart disease, there are many medicines that can reduce your risk. These include: · ACE inhibitors. These are a type of blood pressure medicine. They can reduce the risk of heart attacks and strokes if you are at high risk. · Statin medicines. These lower cholesterol. They can also reduce the risk of heart disease and strokes. · Aspirin. It can help certain people lower their risk of a heart attack or stroke. · Beta-blocker medicines. These are a type of blood pressure and heart medicine. They can reduce the chance of early death if you have had a heart attack. All medicines can cause side effects. So it is important to understand the pros and cons of any medicine you take.  It is also important to take your medicines exactly as your doctor tells you to. Follow-up care is a key part of your treatment and safety. Be sure to make and go to all appointments, and call your doctor if you are having problems. It's also a good idea to know your test results and keep a list of the medicines you take. ACE inhibitors ACE (angiotensin-converting enzyme) inhibitors are used for three main reasons. They lower blood pressure, protect the kidneys, and prevent heart attacks and strokes. Examples include benazepril (Lotensin), lisinopril (Prinivil, Zestril), and ramipril (Altace). Before you start taking an ACE inhibitor, make sure your doctor knows if: 
· You are taking a water pill (diuretic). · You are taking potassium pills or using salt substitutes. · You are pregnant or breastfeeding. · You have had a kidney transplant or other kidney problems. ACE inhibitors can cause side effects. Call your doctor right away if you have: · Trouble breathing. · Swelling in your face, head, neck, or tongue. · Dizziness or lightheadedness. · A dry cough. Statins Statins lower cholesterol. Examples include atorvastatin (Lipitor), lovastatin (Mevacor), pravastatin (Pravachol), and simvastatin (Zocor). Before you start taking a statin, make sure your doctor knows if: 
· You have had a kidney transplant or other kidney problems. · You have liver disease. · You take any other prescription medicine, over-the-counter medicine, vitamins, supplements, or herbal remedies. · You are pregnant or breastfeeding. Statins can cause side effects. Call your doctor right away if you have: · New, severe muscle aches. · Brown urine. Aspirin Taking an aspirin every day can lower your risk for a heart attack. A heart attack occurs when a blood vessel in the heart gets blocked. When this happens, oxygen can't get to the heart muscle, and part of the heart dies. Aspirin can help prevent blood clots that can block the blood vessels. Talk to your doctor before you start taking aspirin every day. He or she may recommend that you take one low-dose aspirin (81 mg) tablet each day, with a meal and a full glass of water. Taking aspirin isn't right for everyone. This is because it can cause serious bleeding. And you may not be able to use aspirin if you: 
· Have asthma. · Have an ulcer or other stomach problem. · Take some other medicine (called a blood thinner) that prevents blood clots. · Are allergic to aspirin. Before having a surgery or procedure, tell your doctor or dentist that you take aspirin. He or she will tell you if you should stop taking aspirin beforehand. Make sure that you understand exactly what your doctor wants you to do. Aspirin can cause side effects. Call your doctor right away if you have: · Unusual bleeding or bruising. · Nausea, vomiting, or heartburn. · Black or bloody stools. Beta-blockers Beta-blockers are used for three main reasons. They lower blood pressure, relieve angina symptoms (such as chest pain or pressure), and reduce the chances of a second heart attack. They include atenolol (Tenormin), carvedilol (Coreg), and metoprolol (Lopressor). Before you start taking a beta-blocker, make sure your doctor knows if you have: · Severe asthma or frequent asthma attacks. · A very slow pulse (less than 55 beats a minute). Beta-blockers can cause side effects. Call your doctor right away if you have: · Wheezing or trouble breathing. · Dizziness or lightheadedness. · Asthma that gets worse. When should you call for help? Watch closely for changes in your health, and be sure to contact your doctor if you have any problems. Where can you learn more? Go to http://corie-radha.info/. Enter R428 in the search box to learn more about \"Reducing Heart Attack Risk With Daily Medicine: Care Instructions. \" Current as of: September 21, 2016 Content Version: 11.4 © 5037-9230 [employer], [employer]. Care instructions adapted under license by [employer] (which disclaims liability or warranty for this information). If you have questions about a medical condition or this instruction, always ask your healthcare professional. [address] any warranty or liability for your use of this information. Learning About Benefits From Quitting Smoking How does quitting smoking make you healthier? If you're thinking about quitting smoking, you may have a few reasons to be smoke-free. Your health may be one of them. · When you quit smoking, you lower your risks for cancer, lung disease, heart attack, stroke, blood vessel disease, and blindness from macular degeneration. · When you're smoke-free, you get sick less often, and you heal faster. You are less likely to get colds, flu, bronchitis, and pneumonia. · As a nonsmoker, you may find that your mood is better and you are less stressed. When and how will you feel healthier? Quitting has real health benefits that start from day 1 of being smoke-free. And the longer you stay smoke-free, the healthier you get and the better you feel. The first hours · After just 20 minutes, your blood pressure and heart rate go down. That means there's less stress on your heart and blood vessels. · Within 12 hours, the level of carbon monoxide in your blood drops back to normal. That makes room for more oxygen. With more oxygen in your body, you may notice that you have more energy than when you smoked. After 2 weeks · Your lungs start to work better. · Your risk of heart attack starts to drop. After 1 month · When your lungs are clear, you cough less and breathe deeper, so it's easier to be active. · Your sense of taste and smell return. That means you can enjoy food more than you have since you started smoking. Over the years · After 1 year, your risk of heart disease is half what it would be if you kept smoking. · After 5 years, your risk of stroke starts to shrink. Within a few years after that, it's about the same as if you'd never smoked. · After 10 years, your risk of dying from lung cancer is cut by about half. And your risk for many other types of cancer is lower too. How would quitting help others in your life? When you quit smoking, you improve the health of everyone who now breathes in your smoke. · Their heart, lung, and cancer risks drop, much like yours. · They are sick less. For babies and small children, living smoke-free means they're less likely to have ear infections, pneumonia, and bronchitis. · If you're a woman who is or will be pregnant someday, quitting smoking means a healthier . · Children who are close to you are less likely to become adult smokers. Where can you learn more? Go to http://corieSatariiradha.info/. Enter [id number] in the search box to learn more about \"Learning About Benefits From Quitting Smoking. \" Current as of: 2017 Content Version: 11.4 © 9285-4953 [employer]. Care instructions adapted under license by Dinn[employer]inn (which disclaims liability or warranty for this information). If you have questions about a medical condition or this instruction, always ask your healthcare professional. [place] [address] any warranty or liability for your use of this information. DISCHARGE SUMMARY from Nurse PATIENT INSTRUCTIONS: 
 
 
F-face looks uneven A-arms unable to move or move unevenly S-speech slurred or non-existent T-time-call 911 as soon as signs and symptoms begin-DO NOT go Back to bed or wait to see if you get better-TIME IS BRAIN. Warning Signs of HEART ATTACK Call 911 if you have these symptoms: ? Chest discomfort. Most heart attacks involve discomfort in the center of the chest that lasts more than a few minutes, or that goes away and comes back. It can feel like uncomfortable pressure, squeezing, fullness, or pain. ? Discomfort in other areas of the upper body. Symptoms can include pain or discomfort in one or both arms, the back, neck, jaw, or stomach. ? Shortness of breath with or without chest discomfort. ? Other signs may include breaking out in a cold sweat, nausea, or lightheadedness. Don't wait more than five minutes to call [address]! Fast action can save your life. Calling 911 is almost always the fastest way to get lifesaving treatment. Emergency Medical Services staff can begin treatment when they arrive  up to an hour sooner than if someone gets to the hospital by car. The discharge information has been reviewed with the patient. The patient verbalized understanding. Discharge medications reviewed with the patient and appropriate educational materials and side effects teaching were provided. ___________________________________________________________________________________________________________________________________ [employer] Announcement We are excited to announce that we are making your provider's discharge notes available to you in [employer]. You will see these notes when they are completed and signed by the physician that discharged you from your recent hospital stay. If you have any questions or concerns about any information you see in [employer], please call the Health Information Department where you were seen or reach out to your Primary Care Provider for more information about your plan of care. Introducing [facility]! [employer] introduces [employer] patient portal. Now you can access parts of your medical record, email your doctor's office, and request medication refills online.    
 
1. In your internet browser, go to [email or web address] 2. Click on the First Time User? Click Here link in the Sign In box. You will see the New Member Sign Up page. 3. Enter your [employer] Access Code exactly as it appears below. You will not need to use this code after youve completed the sign-up process. If you do not sign up before the expiration date, you must request a new code. · [employer] Access Code: [email or web address] Expires: 8/8/2018  6:29 PM 
 
4. Enter the last four digits of your Social Security Number (xxxx) and Date of Birth (mm/dd/yyyy) as indicated and click Submit. You will be taken to the next sign-up page. 5. Create a Storwizet ID. This will be your [employer] login ID and cannot be changed, so think of one that is secure and easy to remember. 6. Create a [employer] password. You can change your password at any time. 7. Enter your Password Reset Question and Answer. This can be used at a later time if you forget your password. 8. Enter your e-mail address. You will receive e-mail notification when new information is available in [address] 9. Click Sign Up. You can now view and download portions of your medical record. 10. Click the Download Summary menu link to download a portable copy of your medical information. If you have questions, please visit the Frequently Asked Questions section of the [employer] website. Remember, [employer] is NOT to be used for urgent needs. For medical emergencies, dial 911. Now available from your iPhone and Android! Introducing Andrei Brown As a [facility] patient, I wanted to make you aware of our electronic visit tool called Andrei Brown. [facility] 24/7 allows you to connect within minutes with a medical provider 24 hours a day, seven days a week via a mobile device or tablet or logging into a secure website from your computer. You can access Andrei Brown from anywhere in the [place]. A virtual visit might be right for you when you have a simple condition and feel like you just dont want to get out of bed, or cant get away from work for an appointment, when your regular [employer] provider is not available (evenings, weekends or holidays), or when youre out of town and need minor care. Electronic visits cost only $49 and if the [employer] 24/7 provider determines a prescription is needed to treat your condition, one can be electronically transmitted to a nearby pharmacy*. Please take a moment to enroll today if you have not already done so. The enrollment process is free and takes just a few minutes. To enroll, please download the [employer] 24/7 anam to your tablet or phone, or visit [email or web address] to enroll on your computer. And, as an [address] [place] [address] patient with a myseekit account, the results of your visits will be scanned into your electronic medical record and your primary care provider will be able to view the scanned results. We urge you to continue to see your regular [employer] provider for your ongoing medical care. And while your primary care provider may not be the one available when you seek a [employer] virtual visit, the peace of mind you get from getting a real diagnosis real time can be priceless. For more information on [employer], view our Frequently Asked Questions (FAQs) at [email or web address] [employer] Sincerely, 
 
Oliver Hoffman MD 
Chief Medical Officer Je Whiting *:  certain medications cannot be prescribed via [employer] Providers Seen During Your Hospitalization Provider Specialty Primary office phone Duarte Lin MD Emergency Medicine [phone] Wilberto Murray MD Internal Medicine [phone] Your Primary Care Physician (PCP) Primary Care Physician Office Phone Office Fax  NONE ** None ** ** None **  
  
 You are allergic to the following No active allergies Recent Documentation Height Weight BMI  
  
  
 1.575 m 53.3 kg 21.47 kg/m2 Emergency Contacts Name Discharge Info Relation Home Work Mobile Jacqueline Szymanski DISCHARGE CAREGIVER [3] Next of Kin [27] [phone] Patient Belongings The following personal items are in your possession at time of discharge: 
  Dental Appliances: None         Home Medications: None   Jewelry: Earrings, With patient  Clothing: Pants, Shirt, Undergarments, Footwear, With patient    Other Valuables: Cell Phone, With patient Discharge Instructions Attachments/References MEFS - TICAGRELOR (BRILINTA) - (BY MOUTH) (ENGLISH) MEFS - ASPIRIN (YOU EXTRA STRENGTH, YOU ASPIRIN CHILDREN'S, BUFFERIN, BUFFERIN LOW DOSE) - (BY MOUTH) (ENGLISH) MEFS - CARVEDILOL (COREG, COREG CR, HYPERTENEVIDE-12.5) - (BY MOUTH) (ENGLISH) MEFS - ENALAPRIL (EPANED, VASOTEC) - (BY MOUTH) (ENGLISH) MEFS - ATORVASTATIN (LIPITOR) - (BY MOUTH) (ENGLISH) Patient Handouts Ticagrelor (Brilinta) - (By mouth) Why this medicine is used:  
Helps prevent stroke, heart attack, and other heart problems. Contact a nurse or doctor right away if you have: 
· Sudden or severe headache · Shortness of breath, trouble breathing · Bloody vomit or vomit that looks like coffee grounds; bloody or black, tarry stools · Bleeding that does not stop or bruises that do not heal  
 
Common side effects: · Minor bleeding or bruising · Headache © 2017 [facility] Information is for End User's use only and may not be sold, redistributed or otherwise used for commercial purposes. Aspirin (You Extra Strength, You Aspirin Children's, Bufferin, Bufferin Low Dose) - (By mouth) Why this medicine is used:  
Treats pain, fever, and inflammation. May also reduce the risk of heart attack. Contact a nurse or doctor right away if you have: · Bloody vomit or vomit that looks like coffee grounds · Blood in urine or bloody or black, tarry stools · Wheezing or trouble breathing Common side effects: · Upset stomach 
© 2017 [facility] [facility] Information is for End User's use only and may not be sold, redistributed or otherwise used for commercial purposes. Carvedilol (Coreg, Coreg CR, Hypertenevide-12.5) - (By mouth) Why this medicine is used:  
Treats high blood pressure and heart failure. Contact a nurse or doctor right away if you have: 
· Change in how much or how often you urinate · Leg pain when you walk, legs and feet that feel cold or numb · Lightheadedness, dizziness, fainting · Rapid weight gain, swelling in your hands, ankles, or feet Common side effects: · Mild dizziness · Tiredness · Trouble having sex · Diarrhea © 2017 [facility] Information is for End User's use only and may not be sold, redistributed or otherwise used for commercial purposes. Enalapril (Epaned, Vasotec) - (By mouth) Why this medicine is used:  
Treats high blood pressure and heart failure. Contact a nurse or doctor right away if you have: · Uneven heartbeat, trouble breathing · Numbness or tingling in your hands, feet, or lips · Severe stomach pain (with or without nausea or vomiting) · Confusion, weakness, lightheadedness, dizziness, fainting · Change in how much or how often you urinate Common side effects: · Dry cough · Headache © 2017 [address] [facility] Information is for End User's use only and may not be sold, redistributed or otherwise used for commercial purposes. Atorvastatin (Lipitor) - (By mouth) Why this medicine is used:  
Treats high cholesterol and triglyceride levels. Reduces the risk of angina, stroke, heart attack, or certain heart and blood vessel problems. Contact a nurse or doctor right away if you have: · Severe headache, confusion, trouble speaking · Dark urine or pale stools · Yellow skin or eyes · Nausea, vomiting, loss of appetite, stomach pain · Muscle pain, tenderness, or weakness; unusual tiredness Common side effects: 
· Diarrhea · Joint pain © 2017 [facility] Information is for End User's use only and may not be sold, redistributed or otherwise used for commercial purposes. Please provide this summary of care documentation to your next provider. Signatures-by signing, you are acknowledging that this After Visit Summary has been reviewed with you and you have received a copy. Patient Signature:  ____________________________________________________________ Date:  ____________________________________________________________  
  
Angela Cui Provider Signature:  ____________________________________________________________ Date:  ____________________________________________________________

## 2018-05-07 NOTE — PROGRESS NOTES
conducted an initial consultation and Spiritual Assessment for Marla Chao, who is a 59 y.o.,male. Patients Primary Language is: Minor Ronde According to the patients EMR Sabianist Affiliation is: Minor Ronde The reason the Patient came to the hospital is:   Patient Active Problem List    Diagnosis Date Noted    STEMI (ST elevation myocardial infarction) ([facility] [address] 05/07/2018        The  provided the following Interventions:  Initiated a relationship of care and support. Explored issues of mao, belief, spirituality and Uatsdin/ritual needs while hospitalized. Listened empathically. Provided chaplaincy education. Provided information about Spiritual Care Services. Offered prayer and assurance of continued prayers on patient's behalf. Chart reviewed. The following outcomes where achieved:  Patient shared limited information about both their medical narrative and spiritual journey/beliefs.  confirmed Patient's Sabianist Affiliation. Patient processed feeling about current hospitalization. Patient expressed gratitude for 's visit. Assessment:  Patient does not have any Uatsdin/cultural needs that will affect patients preferences in health care. There are no spiritual or Uatsdin issues which require intervention at this time. Plan:  Chaplains will continue to follow and will provide pastoral care on an as needed/requested basis.  recommends bedside caregivers page  on duty if patient shows signs of acute spiritual or emotional distress.     Benton Mendoza, [address] [facility]  Spiritual Care  [phone]

## 2018-05-07 NOTE — ROUTINE PROCESS
TRANSFER - IN REPORT:    Verbal report received from Snehal Scherer, [facility]. (name) on Janis Salvage  being received from [facility] (unit) for urgent transfer      Report consisted of patients Situation, Background, Assessment and   Recommendations(SBAR). Information from the following report(s) SBAR, Intake/Output, MAR and Recent Results was reviewed with the receiving nurse. Opportunity for questions and clarification was provided. Assessment completed upon patients arrival to unit and care assumed.

## 2018-05-07 NOTE — ADDENDUM NOTE
Addendum  created 05/07/18 1319 by Genesis Wright CRNA    Anesthesia Event edited, Anesthesia Staff edited

## 2018-05-07 NOTE — PROGRESS NOTES
05/07/18 1323   Weaning Parameters   Spontaneous Breathing Trial Complete Yes   Resp Rate Observed 21   Ve 9.3      RSBI 50

## 2018-05-07 NOTE — ANESTHESIA PREPROCEDURE EVALUATION
Anesthetic History               Review of Systems / Medical History      Pulmonary                   Neuro/Psych              Cardiovascular                  Exercise tolerance: <4 METS     GI/Hepatic/Renal                Endo/Other             Other Findings                   Anesthetic Plan    ASA: 5, emergent  Anesthesia type: general

## 2018-05-07 NOTE — ROUTINE PROCESS
TRANSFER - OUT REPORT:    Verbal report given to CRISTHIAN Massey(name) on Branden Hanley  being transferred to CV[facility] ICU(unit) for routine progression of care       Report consisted of patients Situation, Background, Assessment and   Recommendations(SBAR). Information from the following report(s) SBAR, Procedure Summary and MAR was reviewed with the receiving nurse. Lines:   Peripheral IV 05/07/18 Left Antecubital (Active)   Site Assessment Clean, dry, & intact 5/7/2018 11:07 AM   Phlebitis Assessment 0 5/7/2018 11:07 AM   Infiltration Assessment 0 5/7/2018 11:07 AM   Dressing Status Clean, dry, & intact 5/7/2018 11:07 AM       Peripheral IV 05/07/18 Right Antecubital (Active)   Site Assessment Clean, dry, & intact 5/7/2018 11:07 AM   Phlebitis Assessment 0 5/7/2018 11:07 AM   Infiltration Assessment 0 5/7/2018 11:07 AM   Dressing Status Clean, dry, & intact 5/7/2018 11:07 AM        Opportunity for questions and clarification was provided.       Patient transported with:   Monitor  Tech

## 2018-05-07 NOTE — H&P
HISTORY & PHYSICAL            Patient: Marca Sicard MRN: [id number]  CSN: [id number]    YOB: 1960  Age: 59 y.o. Sex: male    DOA: 2018 LOS:  LOS: 0 days        DOA: 2018        Assessment/Plan     Active Problems:    STEMI (ST elevation myocardial infarction) ([facility] [address] (2018)        Plan:  1. STEMI - Pt taken to cath lab per code STEMI - EKG showed ST elevation in Anterior & lateral  leads   2. Chronic smoker - pt has been counseled to stop smoking   DVT Px - Heparin   FC           Severity of Signs & Symptoms -- Moderate/ High   Risk of adverse events --  High  Current Medical Rx Plan - As Above   Patient history & comorbidities - Per HPI  Discharge Plan -- Home            HPI:     Marca Sicard is a 59 y.o. male who is being taken to the cath lab post Code STEMI in the ER - pt mentions he has been having CP off & on for about 2 weeks -- says last night he had CP which subsided around 5 pm - he was able to go to bed. This morning around 7Am the pain started again & when he got to work at 8.15 his arms started to tighten & the pain got worse -- he was sweating & vomited -- called EMS & came to ER   ER eval - pt noted to have STEMI - Anterior leads & taken to cath lab  Pt doesn't have any significant PMHx , strong FHx of CAD , he is a chronic smoker - 1 ppd   Hospitalist consulted for admission     No past medical history on file. No past surgical history on file. No family history on file.  - Strong FHx of CAD - Father  at 28y MI - Both brother & sister have undergone bypass surgeries     Social History     Social History    Marital status: N/A     Spouse name: N/A    Number of children: N/A    Years of education: N/A     Social History Main Topics    Smoking status: Not on file    Smokeless tobacco: Not on file    Alcohol use Not on file    Drug use: Not on file    Sexual activity: Not on file     Other Topics Concern    Not on file     Social History Narrative Prior to Admission medications    Not on File       No Known Allergies    Review of Systems  A comprehensive review of systems was negative except for that written in the History of Present Illness. Physical Exam:      Visit Vitals    BP (!) 87/67    Pulse 74    Temp 97.9 °F (36.6 °C)    Resp 13    Ht 5' 2\" (1.575 m)    Wt 54.4 kg (120 lb)    SpO2 100%    BMI 21.95 kg/m2       Physical Exam:    Gen: In general, this is a well nourished male in no acute distress  HEENT: Sclerae nonicteric. Oral mucous membranes moist. Dentition poor  Neck: Supple with midline trachea. CV: RRR without murmur or rub appreciated. Resp:Respirations are unlabored without use of accessory muscles. Lung fields bilaterally without wheezes or rhonchi. Abd: Soft, nontender, nondistended. Extrem: Extremities are warm, without cyanosis or clubbing. No pitting pretibial edema. Skin: Warm, no visible rashes. Neuro: Patient is alert, oriented, and cooperative. No obvious focal defects. Moves all 4 extremities. Labs Reviewed:    Recent Results (from the past 24 hour(s))   CBC WITH AUTOMATED DIFF    Collection Time: 05/07/18 10:50 AM   Result Value Ref Range    WBC 17.1 (H) 4.6 - 13.2 K/uL    RBC 5.03 4.70 - 5.50 M/uL    HGB 15.3 13.0 - 16.0 g/dL    HCT 43.2 36.0 - 48.0 %    MCV 85.9 74.0 - 97.0 FL    MCH 30.4 24.0 - 34.0 PG    MCHC 35.4 31.0 - 37.0 g/dL    RDW 13.3 11.6 - 14.5 %    PLATELET 007 049 - 043 K/uL    MPV 10.7 9.2 - 11.8 FL    NEUTROPHILS 49 42 - 75 %    LYMPHOCYTES 33 20 - 51 %    MONOCYTES 15 (H) 2 - 9 %    EOSINOPHILS 2 0 - 5 %    BASOPHILS 1 0 - 3 %    ABS. NEUTROPHILS 8.4 (H) 1.8 - 8.0 K/UL    ABS. LYMPHOCYTES 5.6 (H) 0.8 - 3.5 K/UL    ABS. MONOCYTES 2.6 (H) 0 - 1.0 K/UL    ABS. EOSINOPHILS 0.3 0.0 - 0.4 K/UL    ABS.  BASOPHILS 0.2 (H) 0.0 - 0.06 K/UL    DF MANUAL      PLATELET COMMENTS ADEQUATE PLATELETS      RBC COMMENTS NORMOCYTIC, NORMOCHROMIC     METABOLIC PANEL, BASIC    Collection Time: 05/07/18 10:50 AM   Result Value Ref Range    Sodium 139 136 - 145 mmol/L    Potassium 3.2 (L) 3.5 - 5.5 mmol/L    Chloride 106 100 - 108 mmol/L    CO2 28 21 - 32 mmol/L    Anion gap 5 3.0 - 18 mmol/L    Glucose 86 74 - 99 mg/dL    BUN 16 7.0 - 18 MG/DL    Creatinine 0.98 0.6 - 1.3 MG/DL    BUN/Creatinine ratio 16 12 - 20      GFR est AA >60 >60 ml/min/1.73m2    GFR est non-AA >60 >60 ml/min/1.73m2    Calcium 9.1 8.5 - 10.1 MG/DL   TROPONIN I    Collection Time: 05/07/18 10:50 AM   Result Value Ref Range    Troponin-I, Qt. 3.73 (HH) 0.0 - 0.045 NG/ML   EKG, 12 LEAD, INITIAL    Collection Time: 05/07/18 11:04 AM   Result Value Ref Range    Ventricular Rate 88 BPM    Atrial Rate 88 BPM    P-R Interval 126 ms    QRS Duration 76 ms    Q-T Interval 360 ms    QTC Calculation (Bezet) 435 ms    Calculated P Axis 73 degrees    Calculated R Axis 23 degrees    Calculated T Axis 33 degrees    Diagnosis       Normal sinus rhythm  Anteroseptal infarct , possibly acute  Inferolateral injury pattern  ACUTE MI  Abnormal ECG  No previous ECGs available  Confirmed by Samara Correa ([id number]) on 5/7/2018 1:22:14 PM     POC CHEM8    Collection Time: 05/07/18 11:06 AM   Result Value Ref Range    CO2, POC 28 (H) 19 - 24 MMOL/L    Glucose, POC 87 74 - 106 MG/DL    BUN, POC 17 7 - 18 MG/DL    Creatinine, POC 1.0 0.6 - 1.3 MG/DL    GFRAA, POC >60 >60 ml/min/1.73m2    GFRNA, POC >60 >60 ml/min/1.73m2    Sodium,  136 - 145 MMOL/L    Potassium, POC 3.2 (L) 3.5 - 5.5 MMOL/L    Calcium, ionized (POC) 1.18 1.12 - 1.32 mmol/L    Chloride,  100 - 108 MMOL/L    Anion gap, POC 16 10 - 20      Hematocrit, POC 45 36 - 49 %    Hemoglobin, POC 15.3 12 - 16 G/DL   CBC W/O DIFF    Collection Time: 05/07/18 11:29 AM   Result Value Ref Range    WBC 15.8 (H) 4.6 - 13.2 K/uL    RBC 4.69 (L) 4.70 - 5.50 M/uL    HGB 14.0 13.0 - 16.0 g/dL    HCT 39.5 36.0 - 48.0 %    MCV 84.2 74.0 - 97.0 FL    MCH 29.9 24.0 - 34.0 PG    MCHC 35.4 31.0 - 37.0 g/dL    RDW 13.2 11.6 - 14.5 %    PLATELET 186 079 - 515 K/uL    MPV 10.9 9.2 - 88.9 FL   METABOLIC PANEL, BASIC    Collection Time: 05/07/18 11:29 AM   Result Value Ref Range    Sodium 138 136 - 145 mmol/L    Potassium 3.7 3.5 - 5.5 mmol/L    Chloride 108 100 - 108 mmol/L    CO2 25 21 - 32 mmol/L    Anion gap 5 3.0 - 18 mmol/L    Glucose 147 (H) 74 - 99 mg/dL    BUN 16 7.0 - 18 MG/DL    Creatinine 0.82 0.6 - 1.3 MG/DL    BUN/Creatinine ratio 20 12 - 20      GFR est AA >60 >60 ml/min/1.73m2    GFR est non-AA >60 >60 ml/min/1.73m2    Calcium 8.4 (L) 8.5 - 10.1 MG/DL   POC ACTIVATED CLOTTING TIME    Collection Time: 05/07/18 11:31 AM   Result Value Ref Range    Activated Clotting Time (POC) 158 (H) 79 - 138 SECS   POC ACTIVATED CLOTTING TIME    Collection Time: 05/07/18 12:23 PM   Result Value Ref Range    Activated Clotting Time (POC) 401 (H) 79 - 138 SECS   EKG, 12 LEAD, INITIAL    Collection Time: 05/07/18  1:46 PM   Result Value Ref Range    Ventricular Rate 98 BPM    Atrial Rate 98 BPM    P-R Interval 144 ms    QRS Duration 90 ms    Q-T Interval 302 ms    QTC Calculation (Bezet) 385 ms    Calculated P Axis 70 degrees    Calculated R Axis 55 degrees    Calculated T Axis 69 degrees    Diagnosis       Normal sinus rhythm  Anteroseptal infarct (cited on or before 07-MAY-2018)  Lateral injury pattern  ACUTE MI  Abnormal ECG  When compared with ECG of 07-MAY-2018 11:04,  Serial changes of evolving Anteroseptal infarct present     POC CG8I    Collection Time: 05/07/18  1:51 PM   Result Value Ref Range    Device: VENT      FIO2 (POC) 40 %    pH (POC) 7.353 7.35 - 7.45      pCO2 (POC) 42.4 35.0 - 45.0 MMHG    pO2 (POC) 70 (L) 80 - 100 MMHG    HCO3 (POC) 23.6 22 - 26 MMOL/L    sO2 (POC) 93 92 - 97 %    Base deficit (POC) 2 mmol/L    Mode CPAP/SPON      PEEP/CPAP (POC) 5 cmH2O    Pressure support 7 cmH2O    Allens test (POC) N/A      Total resp.  rate 24      Site DRAWN FROM ARTERIAL LINE      Specimen type (POC) ARTERIAL      Sodium,  136 - 145 MMOL/L    Potassium, POC 3.9 3.5 - 5.5 MMOL/L    Glucose,  (H) 74 - 106 MG/DL    Calcium, ionized (POC) 1.21 1.12 - 1.32 mmol/L    Hematocrit, POC 39 36 - 49 %    Hemoglobin, POC 13.3 12 - 16 G/DL    Performed by Lobito Zamorano    POC ACTIVATED CLOTTING TIME    Collection Time: 05/07/18  3:00 PM   Result Value Ref Range    Activated Clotting Time (POC) 114 79 - 138 SECS       Imaging Reviewed:    None         Kenia Multani MD  5/7/2018, 3:59 PM

## 2018-05-07 NOTE — PROGRESS NOTES
Pt extubated to 4 LPM NC following successful SBT, positive cuff leak test and ABG. Suctioned airway pre and post procedure. No complications. Will continue to monitor.

## 2018-05-07 NOTE — PROCEDURES
[address] [place] [address] Dr  PROCEDURE NOTE    Name:Samina Melo  MR#: [id number]  : 1960  ACCOUNT #: [de-identified]   DATE OF SERVICE: 2018    PROCEDURE PERFORMED BY:  Pasquale Jarvis MD     NAME OF PROCEDURE:  Coronary angiogram.  Status post percutaneous transluminal coronary angioplasty/stent to left anterior descending artery. INDICATION:  Acute anterior wall ST elevation myocardial infarction. COMPLICATIONS:  None. ENTRY:  Right radial artery, right femoral artery. SEDATION:  Moderate sedation was obtained using 5 mg of Versed and 100 mcg of fentanyl. Total sedation time was 77 minutes. PROCEDURE IN DETAIL:  Patient presented to the Emergency Room with ongoing chest pain. EKG revealed ST elevation in the anterior leads. The patient was brought to the cardiac catheterization laboratory emergently. No obvious contraindication to cardiac cath. Due to ongoing chest pain, we decided to proceed with coronary angiogram.  Patient was placed prepped and draped in a sterile fashion. 1% lidocaine was injected in the right radial artery. Right radial artery was accessed using a 6-Haitian arterial sheath without any complication. Following this, 2.5 mg of verapamil and 200 mcg of nitroglycerin was administered intra-arterially to relieve vasospasm. Initial right coronary angiogram was performed using a JR catheter. Left coronary angiogram was performed using an XB LAD 3.5 guide. Patient was noted to have a complete occlusion of the left anterior descending artery. At this time, the patient has continued to have ongoing chest pain and is very uncomfortable and due to the ostial nature of the lesion, we switched to a groin approach. At this time, right groin was prepped and draped in a sterile fashion. 1% lidocaine injected in the right groin and right femoral artery was accessed using a 6-Haitian arterial sheath without any complication.   An XB LAD 3.5 guide was then advanced and positioned in the left main artery. LAD has an acute takeoff and is subtotally occluded in the proximal portion. To stabilize the guide, we had to advance a Runthrough wire and position the wire in the circumflex artery. Following this, additional heparin was administered to achieve ACT greater than 200. Two boluses of Integrilin were administered. We then advanced a BMW wire and with careful manipulation, we were able to cross the lesion. An initial attempt was made to perform aspiration thrombectomy; however, the wire got prolapsed and we were unable to rewire the LAD. Dr. Steffanie Dominguez was available to help us in the procedure and he was able to cross the lesion. A universal BMW wire was advanced into the distal portion of the LAD. A PTCA was performed using a 2.75 x 12 mm balloon. Three inflations were made in the proximal to mid portion. Following this, we noted FANNIE 3 flow in the LAD. Post PTCA, the patient had diffuse stenosis of the proximal to mid LAD. A 2.75 x 32 mm stent was deployed to the left anterior descending artery, extending from the proximal to the mid portion. Stent was deployed about 12-13 atmospheres. Lesion reduced to 0%. FANNIE 3 flow with excellent angiographic result was noted. During the procedure, the patient became very uncomfortable and restless and was unable to follow commands. He got adequate sedation, but in spite of that, he was not cooperative. Hence, we decided to proceed with intubation and aggressive sedation. Intubation was provided by Anesthesia. Following this, supporting catheter and wire were removed. A TR band was applied to right radial artery to achieve hemostasis. Patient was transferred to ICU for further management and hemostasis. FINDINGS:    1. The left main is short, bifurcates into left anterior descending artery and circumflex artery. 2.  Left anterior descending artery is occluded in the proximal portion.   Status post PTCA using a 2.75 x 12 mm balloon, followed by a Synergy 2.75 x 32 mm stent deployed in the proximal to mid portion. Stent deployed about 13 atmospheres. Post PCI, the patient had FANNIE 3 flow with excellent angiographic result. There was some distal vessel spasm, which was relieved with intracoronary nitroglycerin. Diagonal 1 and diagonal 2 arteries are small caliber vessels and they appear patent. 3.  Circumflex artery is codominant and is mainly represented by obtuse marginal 1 artery, which is patent. 4.  Right coronary artery is a moderate caliber vessel and appears patent. It bifurcates into right posterolateral and posterior descending artery. CONCLUSION:  Single vessel coronary artery disease. Occluded left anterior descending artery. Status post PTCA/stent to LAD using a drug-eluting stent. We will continue ventilator support for a few more hours and once the patient starts to awaken, we will consider extubation. The patient is advised to be on aspirin and Brilinta at this time.       MD Halina Rico / ILIANA  D: 05/07/2018 13:11     T: 05/07/2018 17:16  JOB #: [id number]

## 2018-05-07 NOTE — ED NOTES
Pt refusing for family to be called, pt also refusing additional dose of nitro in ED stating \"it tastes gross\"

## 2018-05-07 NOTE — CONSULTS
[facility]  Pulmonary, Critical Care, and Sleep Medicine    Name: Marca Sicard MRN: [id number]   : 1960 Hospital: [address] [place] [address]   Date: 2018        Critical Care Initial Patient Consult      IMPRESSION:   1. Acute anterolateral wall MI  2. S/p cath and LAD LUKE  3. Nicotine addiction  4. S/p extubation, uneventful     Patient Active Problem List   Diagnosis Code    STEMI (ST elevation myocardial infarction) ([facility] [address] I21.3        RECOMMENDATIONS:   · COntinue current post MI care per cardiology  · No active pulmonary issues, we'll sign off. Please call if additional input is needed. · Recommend PFTs as an outpatient due to patient's risk of COPD     Subjective/History: This patient has been seen and evaluated at the request of Dr. Onelia Guerrero for post cath ventilator management. 18    Patient is a 59 y.o. male who was well until 2 days ago when he first noted epigastric discomfort at rest and exertion which he considered acid reflux. He went home early, and went to bed with mild chest discomfort. He woke at [facility] today and went to work, travelled from [place] to [place] with his work crew, then had profuse diaphoresis, vomiting, and EMS was called. He was found to be having a STEMI, brought emergently to the cath lab, had an LAD LUKE stent, required sedation and intubation for the procedure due to combativeness. See cath note. He was uneventfully extubated in the CVICU, and is stable from a cardiovascular and respiratory standpoint, with complaint of tracheal irritation after intubation.     PMH: nicotine addiction  Cardiac risk factors: positive family history, nicotine addiction    PSH: none    Past pulmonary history: no asthma, wheezing, cough, pneumonia, DVT, PE    All: none    Fam: F:  age 28 of acute MI  Bro: age 68, had 2-CABG  Sis age 79 had 2-vessel CABG 15 years ago    Soc: never , no children, works as a contractor, no asbestos exposure known, lives alone. Smokes 1/2 PPD x 50 years. Wants to quit immediately. ROS: no HEENT, lymph, onc, pulm, GI, cardiac, , skin, or neuro problems. No regular physician, hasn't seen a doctor in 28 years. No past medical history on file. No past surgical history on file. Prior to Admission medications    Not on File     Current Facility-Administered Medications   Medication Dose Route Frequency    heparin (porcine) injection 4,000 Units  4,000 Units IntraVENous NOW    aspirin 81 mg chewable tablet        aspirin 81 mg chewable tablet        nitroglycerin (NITROSTAT) 0.4 mg tablet        morphine injection 4 mg  4 mg IntraVENous NOW    nitroglycerine compounded injection        eptifibatide (INTEGRILIN) 2 mg/mL injection        ondansetron (ZOFRAN) 4 mg/2 mL injection        sodium chloride (NS) flush 5-10 mL  5-10 mL IntraVENous Q8H    [START ON 2018] aspirin chewable tablet 81 mg  81 mg Oral DAILY    [START ON 2018] ticagrelor (BRILINTA) tablet 90 mg  90 mg Oral Q12H    atorvastatin (LIPITOR) tablet 80 mg  80 mg Oral QHS    midazolam (VERSED) 1 mg/mL injection           Objective:   Vital Signs:    Visit Vitals    BP 96/75    Pulse 90    Temp 97.9 °F (36.6 °C)    Resp 21    Ht 5' 2\" (1.575 m)    Wt 54.4 kg (120 lb)    SpO2 98%    BMI 21.95 kg/m2       O2 Device: Nasal cannula   O2 Flow Rate (L/min): 2 l/min   Temp (24hrs), Av.1 °F (36.7 °C), Min:97.9 °F (36.6 °C), Max:98.3 °F (36.8 °C)       Intake/Output:   Last shift:      701 -  1900  In: 470 [P.O.:120;  I.V.:200]  Out: -   Last 3 shifts:      Intake/Output Summary (Last 24 hours) at 18 1651  Last data filed at 18 1515   Gross per 24 hour   Intake              470 ml   Output                0 ml   Net              470 ml         Ventilator Settings:  Mode Rate Tidal Volume Pressure FiO2 PEEP   Assist control, VC+   440 ml  7 cm H2O 40 % 5 cm H20     Peak airway pressure: 17 cm H2O Minute ventilation: 5.93 l/min    Extubated. Physical Exam:   General:  Alert, cooperative, no distress. Head:  Normocephalic, without obvious abnormality, atraumatic. Eyes:  Conjunctivae/corneas clear. PERRL,   Nose: Nares normal. Septum midline. Mucosa normal. No drainage or sinus tenderness. Throat: Lips, mucosa, and tongue normal. Edentulous. Neck: Supple, symmetrical, trachea midline, no adenopathy, thyroid: no enlargment/tenderness/nodules, no carotid bruit and no JVD. Back:   Symmetric, no curvature. ROM normal.   Lungs:   Clear to auscultation bilaterally. Unlabored respirations, no cough throughout visit, symmetrical, normal inflation. Chest wall:  No tenderness or deformity. Heart:  Regular rate and rhythm, S1, S2 normal, no murmur, click, rub or gallop. Abdomen:   Soft, non-tender. Bowel sounds normal. No masses,  No organomegaly. Extremities: Extremities normal, atraumatic, no cyanosis or edema. Pulses: 2+ and symmetric all extremities. Skin: Skin color, texture, turgor normal. No rashes or lesions   Lymph nodes:      Cervical, supraclavicular, and axillary nodes normal.   Neurologic: Grossly nonfocal         Data:     Recent Results (from the past 24 hour(s))   CBC WITH AUTOMATED DIFF    Collection Time: 05/07/18 10:50 AM   Result Value Ref Range    WBC 17.1 (H) 4.6 - 13.2 K/uL    RBC 5.03 4.70 - 5.50 M/uL    HGB 15.3 13.0 - 16.0 g/dL    HCT 43.2 36.0 - 48.0 %    MCV 85.9 74.0 - 97.0 FL    MCH 30.4 24.0 - 34.0 PG    MCHC 35.4 31.0 - 37.0 g/dL    RDW 13.3 11.6 - 14.5 %    PLATELET 475 536 - 127 K/uL    MPV 10.7 9.2 - 11.8 FL    NEUTROPHILS 49 42 - 75 %    LYMPHOCYTES 33 20 - 51 %    MONOCYTES 15 (H) 2 - 9 %    EOSINOPHILS 2 0 - 5 %    BASOPHILS 1 0 - 3 %    ABS. NEUTROPHILS 8.4 (H) 1.8 - 8.0 K/UL    ABS. LYMPHOCYTES 5.6 (H) 0.8 - 3.5 K/UL    ABS. MONOCYTES 2.6 (H) 0 - 1.0 K/UL    ABS. EOSINOPHILS 0.3 0.0 - 0.4 K/UL    ABS.  BASOPHILS 0.2 (H) 0.0 - 0.06 K/UL    DF MANUAL PLATELET COMMENTS ADEQUATE PLATELETS      RBC COMMENTS NORMOCYTIC, NORMOCHROMIC     METABOLIC PANEL, BASIC    Collection Time: 05/07/18 10:50 AM   Result Value Ref Range    Sodium 139 136 - 145 mmol/L    Potassium 3.2 (L) 3.5 - 5.5 mmol/L    Chloride 106 100 - 108 mmol/L    CO2 28 21 - 32 mmol/L    Anion gap 5 3.0 - 18 mmol/L    Glucose 86 74 - 99 mg/dL    BUN 16 7.0 - 18 MG/DL    Creatinine 0.98 0.6 - 1.3 MG/DL    BUN/Creatinine ratio 16 12 - 20      GFR est AA >60 >60 ml/min/1.73m2    GFR est non-AA >60 >60 ml/min/1.73m2    Calcium 9.1 8.5 - 10.1 MG/DL   TROPONIN I    Collection Time: 05/07/18 10:50 AM   Result Value Ref Range    Troponin-I, Qt. 3.73 (HH) 0.0 - 0.045 NG/ML   EKG, 12 LEAD, INITIAL    Collection Time: 05/07/18 11:04 AM   Result Value Ref Range    Ventricular Rate 88 BPM    Atrial Rate 88 BPM    P-R Interval 126 ms    QRS Duration 76 ms    Q-T Interval 360 ms    QTC Calculation (Bezet) 435 ms    Calculated P Axis 73 degrees    Calculated R Axis 23 degrees    Calculated T Axis 33 degrees    Diagnosis       Normal sinus rhythm  Anteroseptal infarct , possibly acute  Inferolateral injury pattern  ACUTE MI  Abnormal ECG  No previous ECGs available  Confirmed by Alec Holt [id number]) on 5/7/2018 1:22:14 PM     POC CHEM8    Collection Time: 05/07/18 11:06 AM   Result Value Ref Range    CO2, POC 28 (H) 19 - 24 MMOL/L    Glucose, POC 87 74 - 106 MG/DL    BUN, POC 17 7 - 18 MG/DL    Creatinine, POC 1.0 0.6 - 1.3 MG/DL    GFRAA, POC >60 >60 ml/min/1.73m2    GFRNA, POC >60 >60 ml/min/1.73m2    Sodium,  136 - 145 MMOL/L    Potassium, POC 3.2 (L) 3.5 - 5.5 MMOL/L    Calcium, ionized (POC) 1.18 1.12 - 1.32 mmol/L    Chloride,  100 - 108 MMOL/L    Anion gap, POC 16 10 - 20      Hematocrit, POC 45 36 - 49 %    Hemoglobin, POC 15.3 12 - 16 G/DL   CBC W/O DIFF    Collection Time: 05/07/18 11:29 AM   Result Value Ref Range    WBC 15.8 (H) 4.6 - 13.2 K/uL    RBC 4.69 (L) 4.70 - 5.50 M/uL    HGB 14.0 13.0 - 16.0 g/dL    HCT 39.5 36.0 - 48.0 %    MCV 84.2 74.0 - 97.0 FL    MCH 29.9 24.0 - 34.0 PG    MCHC 35.4 31.0 - 37.0 g/dL    RDW 13.2 11.6 - 14.5 %    PLATELET 929 998 - 973 K/uL    MPV 10.9 9.2 - 05.9 FL   METABOLIC PANEL, BASIC    Collection Time: 05/07/18 11:29 AM   Result Value Ref Range    Sodium 138 136 - 145 mmol/L    Potassium 3.7 3.5 - 5.5 mmol/L    Chloride 108 100 - 108 mmol/L    CO2 25 21 - 32 mmol/L    Anion gap 5 3.0 - 18 mmol/L    Glucose 147 (H) 74 - 99 mg/dL    BUN 16 7.0 - 18 MG/DL    Creatinine 0.82 0.6 - 1.3 MG/DL    BUN/Creatinine ratio 20 12 - 20      GFR est AA >60 >60 ml/min/1.73m2    GFR est non-AA >60 >60 ml/min/1.73m2    Calcium 8.4 (L) 8.5 - 10.1 MG/DL   POC ACTIVATED CLOTTING TIME    Collection Time: 05/07/18 11:31 AM   Result Value Ref Range    Activated Clotting Time (POC) 158 (H) 79 - 138 SECS   POC ACTIVATED CLOTTING TIME    Collection Time: 05/07/18 12:23 PM   Result Value Ref Range    Activated Clotting Time (POC) 401 (H) 79 - 138 SECS   EKG, 12 LEAD, INITIAL    Collection Time: 05/07/18  1:46 PM   Result Value Ref Range    Ventricular Rate 98 BPM    Atrial Rate 98 BPM    P-R Interval 144 ms    QRS Duration 90 ms    Q-T Interval 302 ms    QTC Calculation (Bezet) 385 ms    Calculated P Axis 70 degrees    Calculated R Axis 55 degrees    Calculated T Axis 69 degrees    Diagnosis       Normal sinus rhythm  Anteroseptal infarct (cited on or before 07-MAY-2018)  Lateral injury pattern  ACUTE MI  Abnormal ECG  When compared with ECG of 07-MAY-2018 11:04,  Serial changes of evolving Anteroseptal infarct present  Confirmed by Joni Charles [id number]) on 5/7/2018 4:40:04 PM     POC CG8I    Collection Time: 05/07/18  1:51 PM   Result Value Ref Range    Device: VENT      FIO2 (POC) 40 %    pH (POC) 7.353 7.35 - 7.45      pCO2 (POC) 42.4 35.0 - 45.0 MMHG    pO2 (POC) 70 (L) 80 - 100 MMHG    HCO3 (POC) 23.6 22 - 26 MMOL/L    sO2 (POC) 93 92 - 97 %    Base deficit (POC) 2 mmol/L    Mode CPAP/SPON PEEP/CPAP (POC) 5 cmH2O    Pressure support 7 cmH2O    Allens test (POC) N/A      Total resp.  rate 24      Site DRAWN FROM ARTERIAL LINE      Specimen type (POC) ARTERIAL      Sodium,  136 - 145 MMOL/L    Potassium, POC 3.9 3.5 - 5.5 MMOL/L    Glucose,  (H) 74 - 106 MG/DL    Calcium, ionized (POC) 1.21 1.12 - 1.32 mmol/L    Hematocrit, POC 39 36 - 49 %    Hemoglobin, POC 13.3 12 - 16 G/DL    Performed by Brian White    POC ACTIVATED CLOTTING TIME    Collection Time: 05/07/18  3:00 PM   Result Value Ref Range    Activated Clotting Time (POC) 114 79 - 138 SECS           Recent Labs      05/07/18   1351   FIO2I  40   HCO3I  23.6   PCO2I  42.4   PHI  7.353   PO2I  70*     Imaging:  I have personally reviewed the patients radiographs and have reviewed the reports:  CXR Results  (Last 48 hours)    None           Yenifer Hanson MD   Pulmonary and critical care medicine  5/7/2018 5:16 PM

## 2018-05-07 NOTE — PROGRESS NOTES
SBT initiated, PS 7, PEEP +5, FiO2 40%. Pt awake and follows commands. Tolerating well though very agitated. ABG will follow.

## 2018-05-07 NOTE — PROGRESS NOTES
Pt becoming non-compliant while on table, beginning to move around during emergent LHC, anesthesia contacted for further sedation and possible intubation.

## 2018-05-07 NOTE — ED PROVIDER NOTES
EMERGENCY DEPARTMENT HISTORY AND PHYSICAL EXAM    11:04 AM      Date: 5/7/2018  Patient Name: Rocael Connor    History of Presenting Illness     No chief complaint on file. History Provided By: Patient     Chief Complaint: Chest pain  Duration:  ~10 minutes  Timing:  Acute   Location: Chest radiating to BUE  Quality: N/A  Severity: N/A  Modifying Factors: No relief with 1 NTG and 324 mg aspirin en route   Associated Symptoms: Diaphoresis and vomiting       Additional History (Context): Rocael Connor is a 59 y.o. male presents via EMS with acute chest pain radiating to BUE, onset ~10 minutes ago. Associated sxs include diaphoresis and vomiting. Pt states the pain in his BUE is aching. Endorses tobacco use. Pt was given 1 NTG and 324 mg aspirin en route with no relief. Denies any PMHX or cadiac hx. PCP: No primary care provider on file.     Current Facility-Administered Medications   Medication Dose Route Frequency Provider Last Rate Last Dose    aspirin 81 mg chewable tablet             aspirin 81 mg chewable tablet             nitroglycerin (NITROSTAT) 0.4 mg tablet             ondansetron (ZOFRAN) 4 mg/2 mL injection             nitroglycerine compounded injection             eptifibatide (INTEGRILIN) 2 mg/mL injection             nitroglycerin (NITROSTAT) tablet 0.4 mg  0.4 mg SubLINGual PRN Prema Rodriguez MD        heparin (porcine) injection 4,000 Units  4,000 Units IntraVENous NOW Prema Rodriguez MD        heparinized saline 2 units/mL infusion 1,000 Units  1,000 Units Irrigation ONCE Nieves Nieves MD        heparinized saline 2 units/mL infusion 1,000 Units  1,000 Units Irrigation ONCE Nieves Nieves MD        nitroglycerine compounded injection 100-200 mcg  100-200 mcg IntraCORONary Multiple Nieves Nieves MD        heparin (porcine) 1,000 unit/mL injection 1,000-10,000 Units  1,000-10,000 Units IntraVENous Multiple Nieves Nieves MD   3,000 Units at 05/07/18 1144    lidocaine (PF) (XYLOCAINE) 10 mg/mL (1 %) injection 1-30 mL  1-30 mL SubCUTAneous Multiple Twin Lorenzana MD   3 mL at 05/07/18 1127    iodixanol (VISIPAQUE) 320 mg iodine/mL contrast injection  mL   mL IntraVENous Multiple Twin Lorenzana MD        iopamidol (ISOVUE 300) 61 % contrast injection 100-200 mL  100-200 mL IntraCATHeter RAD ONCE Twin Lorenzana MD        fentaNYL citrate (PF) injection  mcg   mcg IntraVENous Multiple Twin Lorenzana MD   25 mcg at 05/07/18 1126    midazolam (VERSED) injection 1-4 mg  1-4 mg IntraVENous Multiple Twin Lorenzana MD   1 mg at 05/07/18 1127    verapamil (ISOPTIN) 2.5 mg/mL injection 5 mg  5 mg IntraarTERial ONCE Twin Lorenzana MD        morphine injection 4 mg  4 mg IntraVENous NOW Bushra Oreilly MD        eptifibatide (INTEGRILIN) injection 9.8 mg  180 mcg/kg IntraVENous ONCE Twin Lorenzana MD        eptifibatide (INTEGRILIN) 0.75 mg/mL infusion  2 mcg/kg/min IntraVENous CONTINUOUS Twin Lorenzana MD        eptifibatide (INTEGRILIN) injection 9.8 mg  180 mcg/kg IntraVENous ONCE Twin Lorenzana MD           Past History     Past Medical History:  No past medical history on file. Past Surgical History:  No past surgical history on file. Family History:  No family history on file. Social History:  Social History   Substance Use Topics    Smoking status: Not on file    Smokeless tobacco: Not on file    Alcohol use Not on file       Allergies:  Not on File      Review of Systems     Review of Systems   Constitutional: Positive for diaphoresis. Negative for fever. HENT: Negative for congestion and sore throat. Eyes: Negative for pain and itching. Respiratory: Negative for cough and shortness of breath. Cardiovascular: Positive for chest pain. Negative for palpitations. Gastrointestinal: Positive for vomiting.  Negative for abdominal pain and diarrhea. Endocrine: Negative for polydipsia and polyuria. Genitourinary: Negative for dysuria and hematuria. Musculoskeletal: Positive for myalgias (BUE). Negative for arthralgias. Skin: Negative for rash and wound. Neurological: Negative for seizures and syncope. Hematological: Does not bruise/bleed easily. Psychiatric/Behavioral: Negative for agitation and hallucinations. Physical Exam     Visit Vitals    /85    Pulse 81    Temp 98.3 °F (36.8 °C)    Resp 16    Ht 5' 2\" (1.575 m)    Wt 54.4 kg (120 lb)    BMI 21.95 kg/m2       Physical Exam   Constitutional: He appears well-developed and well-nourished. HENT:   Head: Normocephalic and atraumatic. Eyes: Conjunctivae are normal. No scleral icterus. Neck: Normal range of motion. Neck supple. No JVD present. Cardiovascular: Normal rate, regular rhythm and normal heart sounds. 4 intact extremity pulses   Pulmonary/Chest: Effort normal and breath sounds normal.   Abdominal: Soft. He exhibits no mass. There is no tenderness. Musculoskeletal: Normal range of motion. Lymphadenopathy:     He has no cervical adenopathy. Neurological: He is alert. Skin: Skin is warm and dry. Nursing note and vitals reviewed. Diagnostic Study Results     Labs -  Recent Results (from the past 12 hour(s))   CBC WITH AUTOMATED DIFF    Collection Time: 05/07/18 10:50 AM   Result Value Ref Range    WBC 17.1 (H) 4.6 - 13.2 K/uL    RBC 5.03 4.70 - 5.50 M/uL    HGB 15.3 13.0 - 16.0 g/dL    HCT 43.2 36.0 - 48.0 %    MCV 85.9 74.0 - 97.0 FL    MCH 30.4 24.0 - 34.0 PG    MCHC 35.4 31.0 - 37.0 g/dL    RDW 13.3 11.6 - 14.5 %    PLATELET 302 168 - 917 K/uL    MPV 10.7 9.2 - 11.8 FL    NEUTROPHILS PENDING %    LYMPHOCYTES PENDING %    MONOCYTES PENDING %    EOSINOPHILS PENDING %    BASOPHILS PENDING %    ABS. NEUTROPHILS PENDING K/UL    ABS. LYMPHOCYTES PENDING K/UL    ABS. MONOCYTES PENDING K/UL    ABS. EOSINOPHILS PENDING K/UL    ABS.  BASOPHILS PENDING K/UL    DF PENDING    METABOLIC PANEL, BASIC    Collection Time: 05/07/18 10:50 AM   Result Value Ref Range    Sodium 139 136 - 145 mmol/L    Potassium 3.2 (L) 3.5 - 5.5 mmol/L    Chloride 106 100 - 108 mmol/L    CO2 28 21 - 32 mmol/L    Anion gap 5 3.0 - 18 mmol/L    Glucose 86 74 - 99 mg/dL    BUN 16 7.0 - 18 MG/DL    Creatinine 0.98 0.6 - 1.3 MG/DL    BUN/Creatinine ratio 16 12 - 20      GFR est AA >60 >60 ml/min/1.73m2    GFR est non-AA >60 >60 ml/min/1.73m2    Calcium 9.1 8.5 - 10.1 MG/DL   TROPONIN I    Collection Time: 05/07/18 10:50 AM   Result Value Ref Range    Troponin-I, Qt. 3.73 (HH) 0.0 - 0.045 NG/ML   POC CHEM8    Collection Time: 05/07/18 11:06 AM   Result Value Ref Range    CO2, POC 28 (H) 19 - 24 MMOL/L    Glucose, POC 87 74 - 106 MG/DL    BUN, POC 17 7 - 18 MG/DL    Creatinine, POC 1.0 0.6 - 1.3 MG/DL    GFRAA, POC >60 >60 ml/min/1.73m2    GFRNA, POC >60 >60 ml/min/1.73m2    Sodium,  136 - 145 MMOL/L    Potassium, POC 3.2 (L) 3.5 - 5.5 MMOL/L    Calcium, ionized (POC) 1.18 1.12 - 1.32 mmol/L    Chloride,  100 - 108 MMOL/L    Anion gap, POC 16 10 - 20      Hematocrit, POC 45 36 - 49 %    Hemoglobin, POC 15.3 12 - 16 G/DL       Radiologic Studies -   No orders to display     No results found. Medical Decision Making   Initial Medical Decision Making and DDx:  Pre-hospital EKG transmission failed. On arrival EKG showed lateral STEMI. Pt still having severe CP. STEMI alert was called. ED Course: Progress Notes, Reevaluation, and Consults:  11:03 AM STEMI alert called. 11:13 AM Pt's pressure is 127/85 so will hold nitro and give 4 mg morphine. Consult: Discussed care with WALE Ramirez. Specialty: cardiology  Standard discussion; including history of patients chief complaint, available diagnostic results, and treatment course. Will evaluate.   11:15 AM, 5/7/2018     Consult:  Discussed care with Niles Banks MD. Specialty: hospitalist  Standard discussion; including history of patients chief complaint, available diagnostic results, and treatment course. Will see and admit. 11:17 AM, 5/7/2018     11:17 AM Pt going upstairs. No acute events. I am the first provider for this patient. I reviewed the vital signs, available nursing notes, past medical history, past surgical history, family history and social history. Vital Signs-Reviewed the patient's vital signs. Pulse Oximetry Analysis -     EKG: Interpreted by the EP. Time Interpreted: 11:04 AM   Rate: 70 bpm   Rhythm: NSR   Interpretation: Marked ST segment elevation in anterior and lateral. Less so in inferior leads. STEMI. Records Reviewed: Nursing Notes (Time of Review: 11:04 AM)    Critical Care Time: 11:17 AM  I have spent 32 minutes of critical care time involved in lab review, consultations with specialist, family decision-making, and documentation. During this entire length of time I was immediately available to the patient. Critical Care: The reason for providing this level of medical care for this critically ill patient was due a critical illness that impaired one or more vital organ systems such that there was a high probability of imminent or life threatening deterioration in the patients condition. This care involved high complexity decision making to assess, manipulate, and support vital system functions, to treat this degreee vital organ system failure and to prevent further life threatening deterioration of the patients condition. For Hospitalized Patients:    2. Aspirin: Aspirin was given    Diagnosis     Clinical Impression:   1. ST elevation myocardial infarction (STEMI) of anterior wall (HCC)      Disposition: admit     There are no discharge medications for this patient.     Scribe [phone] acting as a scribe for and in the presence of Bushra Oreilly MD    May 07, 2018 at 11:47 AM       Provider Attestation:      I personally performed the services described in the documentation, reviewed the documentation, as recorded by the scribe in my presence, and it accurately and completely records my words and actions.  May 07, 2018 at 11:47 AM - Dez Adam MD  _______________________________

## 2018-05-08 ENCOUNTER — APPOINTMENT (OUTPATIENT)
Dept: GENERAL RADIOLOGY | Age: 58
DRG: 247 | End: 2018-05-08
Attending: HOSPITALIST
Payer: SELF-PAY

## 2018-05-08 LAB
ALBUMIN SERPL-MCNC: 2.9 G/DL (ref 3.4–5)
ALBUMIN/GLOB SERPL: 1 {RATIO} (ref 0.8–1.7)
ALP SERPL-CCNC: 86 U/L (ref 45–117)
ALT SERPL-CCNC: 66 U/L (ref 16–61)
APPEARANCE UR: CLEAR
AST SERPL-CCNC: 343 U/L (ref 15–37)
ATRIAL RATE: 82 BPM
BACTERIA URNS QL MICRO: ABNORMAL /HPF
BILIRUB DIRECT SERPL-MCNC: 0.2 MG/DL (ref 0–0.2)
BILIRUB SERPL-MCNC: 1 MG/DL (ref 0.2–1)
BILIRUB UR QL: NEGATIVE
CALCULATED P AXIS, ECG09: 72 DEGREES
CALCULATED R AXIS, ECG10: 59 DEGREES
CALCULATED T AXIS, ECG11: -95 DEGREES
CHOLEST SERPL-MCNC: 136 MG/DL
COLOR UR: YELLOW
DIAGNOSIS, 93000: NORMAL
EPITH CASTS URNS QL MICRO: ABNORMAL /LPF (ref 0–5)
GLOBULIN SER CALC-MCNC: 2.9 G/DL (ref 2–4)
GLUCOSE UR STRIP.AUTO-MCNC: NEGATIVE MG/DL
HDLC SERPL-MCNC: 47 MG/DL (ref 40–60)
HDLC SERPL: 2.9 {RATIO} (ref 0–5)
HGB UR QL STRIP: NEGATIVE
KETONES UR QL STRIP.AUTO: NEGATIVE MG/DL
LDLC SERPL CALC-MCNC: 71.4 MG/DL (ref 0–100)
LEUKOCYTE ESTERASE UR QL STRIP.AUTO: ABNORMAL
LIPID PROFILE,FLP: NORMAL
NITRITE UR QL STRIP.AUTO: NEGATIVE
P-R INTERVAL, ECG05: 140 MS
PH UR STRIP: 7.5 [PH] (ref 5–8)
PROT SERPL-MCNC: 5.8 G/DL (ref 6.4–8.2)
PROT UR STRIP-MCNC: NEGATIVE MG/DL
Q-T INTERVAL, ECG07: 398 MS
QRS DURATION, ECG06: 88 MS
QTC CALCULATION (BEZET), ECG08: 464 MS
RBC #/AREA URNS HPF: ABNORMAL /HPF (ref 0–5)
SP GR UR REFRACTOMETRY: 1.02 (ref 1–1.03)
TRIGL SERPL-MCNC: 88 MG/DL (ref ?–150)
TROPONIN I SERPL-MCNC: 82.7 NG/ML (ref 0–0.04)
UROBILINOGEN UR QL STRIP.AUTO: 4 EU/DL (ref 0.2–1)
VENTRICULAR RATE, ECG03: 82 BPM
VLDLC SERPL CALC-MCNC: 17.6 MG/DL
WBC URNS QL MICRO: ABNORMAL /HPF (ref 0–4)

## 2018-05-08 PROCEDURE — 84484 ASSAY OF TROPONIN QUANT: CPT | Performed by: HOSPITALIST

## 2018-05-08 PROCEDURE — 65660000000 HC RM CCU STEPDOWN

## 2018-05-08 PROCEDURE — 93306 TTE W/DOPPLER COMPLETE: CPT

## 2018-05-08 PROCEDURE — 93005 ELECTROCARDIOGRAM TRACING: CPT

## 2018-05-08 PROCEDURE — 74011250637 HC RX REV CODE- 250/637: Performed by: INTERNAL MEDICINE

## 2018-05-08 PROCEDURE — 74011250637 HC RX REV CODE- 250/637: Performed by: HOSPITALIST

## 2018-05-08 PROCEDURE — 71045 X-RAY EXAM CHEST 1 VIEW: CPT

## 2018-05-08 PROCEDURE — 80076 HEPATIC FUNCTION PANEL: CPT | Performed by: HOSPITALIST

## 2018-05-08 PROCEDURE — 81001 URINALYSIS AUTO W/SCOPE: CPT | Performed by: HOSPITALIST

## 2018-05-08 PROCEDURE — 80061 LIPID PANEL: CPT | Performed by: HOSPITALIST

## 2018-05-08 PROCEDURE — 36415 COLL VENOUS BLD VENIPUNCTURE: CPT | Performed by: HOSPITALIST

## 2018-05-08 RX ORDER — CARVEDILOL 3.12 MG/1
3.12 TABLET ORAL EVERY 12 HOURS
Status: DISCONTINUED | OUTPATIENT
Start: 2018-05-08 | End: 2018-05-10 | Stop reason: HOSPADM

## 2018-05-08 RX ORDER — METOPROLOL TARTRATE 25 MG/1
12.5 TABLET, FILM COATED ORAL EVERY 12 HOURS
Status: DISCONTINUED | OUTPATIENT
Start: 2018-05-08 | End: 2018-05-08

## 2018-05-08 RX ADMIN — CARVEDILOL 3.12 MG: 3.12 TABLET, FILM COATED ORAL at 17:01

## 2018-05-08 RX ADMIN — TICAGRELOR 90 MG: 90 TABLET ORAL at 12:57

## 2018-05-08 RX ADMIN — Medication 10 ML: at 14:27

## 2018-05-08 RX ADMIN — ASPIRIN 81 MG 81 MG: 81 TABLET ORAL at 09:27

## 2018-05-08 RX ADMIN — TICAGRELOR 90 MG: 90 TABLET ORAL at 00:23

## 2018-05-08 RX ADMIN — ACETAMINOPHEN 650 MG: 325 TABLET ORAL at 17:01

## 2018-05-08 RX ADMIN — TICAGRELOR 90 MG: 90 TABLET ORAL at 23:04

## 2018-05-08 RX ADMIN — ATORVASTATIN CALCIUM 80 MG: 40 TABLET, FILM COATED ORAL at 21:00

## 2018-05-08 RX ADMIN — Medication 10 ML: at 21:00

## 2018-05-08 RX ADMIN — Medication 10 ML: at 06:59

## 2018-05-08 RX ADMIN — ACETAMINOPHEN 650 MG: 325 TABLET ORAL at 02:09

## 2018-05-08 RX ADMIN — METOPROLOL TARTRATE 12.5 MG: 25 TABLET ORAL at 09:27

## 2018-05-08 NOTE — PROGRESS NOTES
[facility]      CARDIOLOGY PROGRESS NOTE  RECS:  1. acute anterior wall MI- ekg reveals ST elevation in anterior leads. On going chest pain. No obvious contraindication for cath. Onset of chest pain was 2 days ago with on and off symptoms. Steady pain about 30mins prior to calling ems. 2. Smoker: Patient advised to stop smoking to reduce future cardiovascular events. 3. htn : low normal, change metoprolol to coreg due to low EF  4. oob and ambulate    Ok for stepdown       ASSESSMENT:  Hospital Problems  Never Reviewed          Codes Class Noted POA    STEMI (ST elevation myocardial infarction) ([facility] [address] (Chronic) ICD-10-CM: I21.3  ICD-9-CM: 410.90  5/7/2018 Unknown                SUBJECTIVE:  No CP  No SOB    OBJECTIVE:    VS:   Visit Vitals    BP 92/64    Pulse 76    Temp 97.9 °F (36.6 °C)    Resp 19    Ht 5' 2\" (1.575 m)    Wt 120 lb 4.8 oz (54.6 kg)    SpO2 97%    BMI 22 kg/m2         Intake/Output Summary (Last 24 hours) at 05/08/18 1057  Last data filed at 05/08/18 0800   Gross per 24 hour   Intake              470 ml   Output             1000 ml   Net             -530 ml     TELE: normal sinus rhythm    General: alert and in no apparent distress  HENT: Normocephalic, atraumatic. Normal external eye. Neck :  no bruit, no JVD  Cardiac:  regular rate and rhythm, S1, S2 normal, no murmur, click, rub or gallop  Chest/Lungs:harsh breath sounds noted lung bases  Abdomen: Soft, nontender, no masses  Extremities:  No c/c/edema, peripheral pulses present. Groin exam does not reveal any hematoma or bruits post cath.  Distal pulse is present        Labs: Results:       Chemistry Recent Labs      05/08/18   0605  05/07/18   1129  05/07/18   1050   GLU   --   147*  86   NA   --   138  139   K   --   3.7  3.2*   CL   --   108  106   CO2   --   25  28   BUN   --   16  16   CREA   --   0.82  0.98   CA   --   8.4*  9.1   AGAP   --   5  5   BUCR   --   20  16   AP  86   --    --    TP 5.8*   --    --    ALB  2.9*   --    --    GLOB  2.9   --    --    AGRAT  1.0   --    --       CBC w/Diff Recent Labs      05/07/18   1129  05/07/18   1050   WBC  15.8*  17.1*   RBC  4.69*  5.03   HGB  14.0  15.3   HCT  39.5  43.2   PLT  265  280   GRANS   --   49   LYMPH   --   33   EOS   --   2      Cardiac Enzymes No results for input(s): CPK, CKND1, TIKI in the last 72 hours. No lab exists for component: CKRMB, TROIP   Coagulation No results for input(s): PTP, INR, APTT in the last 72 hours. No lab exists for component: INREXT    Lipid Panel Lab Results   Component Value Date/Time    Cholesterol, total 136 05/08/2018 06:05 AM    HDL Cholesterol 47 05/08/2018 06:05 AM    LDL, calculated 71.4 05/08/2018 06:05 AM    VLDL, calculated 17.6 05/08/2018 06:05 AM    Triglyceride 88 05/08/2018 06:05 AM    CHOL/HDL Ratio 2.9 05/08/2018 06:05 AM      BNP No results for input(s): BNPP in the last 72 hours.    Liver Enzymes Recent Labs      05/08/18   0605   TP  5.8*   ALB  2.9*   AP  86   SGOT  343*      Digoxin    Thyroid Studies No results found for: T4, T3U, TSH, TSHEXT           Fabian Lange MD   Pager # [phone]

## 2018-05-08 NOTE — PROGRESS NOTES
[facility] Hospitalist Group  Progress Note    Patient: Yan Irwin Age: 59 y.o. : 1960 MR#: [id number] SSN: [id number]  Date/Time: 2018     Subjective: pt feels fine, no CP or SOB or dizziness. Pt denies any dysuria or frequency or cough   BP running on lower side with MAP>65. Assessment/Plan:   1. STEMI s/p LAD stenting: cont asa, brillanta, BB and statin  2. Sever cardiomyopathy due to # 1, well compensated. 3. Leukocytosis: possible stress induced, will get UA and CXR. No Abx for now  4. Active tobacco abuse: adv on cessation   5. Abnormal LFT: AST worse than ALT: pt denies any ETOH abuse: ? Due # 1  6. Low BP: with good MAP, cardio aware, wants to cont BB and monitor       I spent 30 minutes with the patient in face-to-face consultation, of which greater than 50% was spent in counseling and coordination of care as described above. Case discussed with:  [x]Patient  []Family  [x]Nursing  []Case Management  DVT Prophylaxis:  []Lovenox  []Hep SQ  [x]SCDs  []Coumadin   []On Heparin gtt    Objective:   VS:   Visit Vitals    BP (!) 81/55    Pulse 84    Temp 98.2 °F (36.8 °C)    Resp 24    Ht 5' 2\" (1.575 m)    Wt 54.6 kg (120 lb 4.8 oz)    SpO2 97%    BMI 22 kg/m2      Tmax/24hrs: Temp (24hrs), Av.4 °F (36.9 °C), Min:97.9 °F (36.6 °C), Max:98.9 °F (37.2 °C)  IOBRIEF  Intake/Output Summary (Last 24 hours) at 18 6557  Last data filed at 18 1705   Gross per 24 hour   Intake              120 ml   Output             1000 ml   Net             -880 ml       General:  Alert, cooperative, no acute distress    Pulmonary:  CTA Bilaterally. Cardiovascular: Regular rate and Rhythm. GI:  Soft, Non distended, Non tender. + Bowel sounds. Extremities:  No edema, cyanosis, clubbing. No calf tenderness. Psych: Good insight. Not anxious or agitated. Neurologic: Alert and oriented X 3. No acute neuro deficits.       Medications:   Current Facility-Administered Medications   Medication Dose Route Frequency    carvedilol (COREG) tablet 3.125 mg  3.125 mg Oral Q12H    nitroglycerin (NITROSTAT) tablet 0.4 mg  0.4 mg SubLINGual PRN    sodium chloride (NS) flush 5-10 mL  5-10 mL IntraVENous Q8H    sodium chloride (NS) flush 5-10 mL  5-10 mL IntraVENous PRN    aspirin chewable tablet 81 mg  81 mg Oral DAILY    atorvastatin (LIPITOR) tablet 80 mg  80 mg Oral QHS    acetaminophen (TYLENOL) tablet 650 mg  650 mg Oral Q6H PRN    ondansetron (ZOFRAN) injection 4 mg  4 mg IntraVENous Q6H PRN    ticagrelor (BRILINTA) tablet 90 mg  90 mg Oral Q12H       Labs:    Recent Results (from the past 24 hour(s))   TROPONIN I    Collection Time: 05/07/18 10:45 PM   Result Value Ref Range    Troponin-I, Qt. 146.00 (HH) 0.0 - 0.045 NG/ML   HEPATIC FUNCTION PANEL    Collection Time: 05/08/18  6:05 AM   Result Value Ref Range    Protein, total 5.8 (L) 6.4 - 8.2 g/dL    Albumin 2.9 (L) 3.4 - 5.0 g/dL    Globulin 2.9 2.0 - 4.0 g/dL    A-G Ratio 1.0 0.8 - 1.7      Bilirubin, total 1.0 0.2 - 1.0 MG/DL    Bilirubin, direct 0.2 0.0 - 0.2 MG/DL    Alk.  phosphatase 86 45 - 117 U/L    AST (SGOT) 343 (H) 15 - 37 U/L    ALT (SGPT) 66 (H) 16 - 61 U/L   LIPID PANEL    Collection Time: 05/08/18  6:05 AM   Result Value Ref Range    LIPID PROFILE          Cholesterol, total 136 <200 MG/DL    Triglyceride 88 <150 MG/DL    HDL Cholesterol 47 40 - 60 MG/DL    LDL, calculated 71.4 0 - 100 MG/DL    VLDL, calculated 17.6 MG/DL    CHOL/HDL Ratio 2.9 0 - 5.0     TROPONIN I    Collection Time: 05/08/18  6:05 AM   Result Value Ref Range    Troponin-I, Qt. 82.70 (HH) 0.0 - 0.045 NG/ML   EKG, 12 LEAD, SUBSEQUENT    Collection Time: 05/08/18  9:22 AM   Result Value Ref Range    Ventricular Rate 82 BPM    Atrial Rate 82 BPM    P-R Interval 140 ms    QRS Duration 88 ms    Q-T Interval 398 ms    QTC Calculation (Bezet) 464 ms    Calculated P Axis 72 degrees    Calculated R Axis 59 degrees    Calculated T Axis -95 degrees Diagnosis       Normal sinus rhythm  Anteroseptal infarct (cited on or before 07-MAY-2018)  T wave abnormality, consider lateral ischemia  ACUTE MI  Abnormal ECG  When compared with ECG of 07-MAY-2018 13:46,  Serial changes of evolving Anteroseptal infarct present  Confirmed by Geri Toledo ([id number]) on 5/8/2018 9:37:03 AM         Signed By: Berkley Alcantara MD     May 8, 2018

## 2018-05-08 NOTE — CARDIO/PULMONARY
Cardiac rehab in to see patient. He was lying in bed. He stated that he was tired. Talked with him briefly about the importance of relaxation. He stated that he watches tv each day and also visits neighbors to relax. Encouraged patient to do at least 10 minutes per day. We then talked about exercise. He stated that he walks and rides a bike for exercise. Encouraged patient to gradually get back to his activity. He stated that prior to coming into the hospital he was riding his bike 25 miles per day and or walking as well. We also discussed the outpatient cardiac rehab program. He stated that he is unsure if he can do that due to his work schedule but he would like to try. We then talked about his diet. He stated that he knows he will need to change that. He stated at present that he eats anything and everything. Encouraged patient to choose fresh and fresh frozen items and then to bake, grill, broil, steam and roast the items that he chooses to prepare. He appreciated the information that was given and reviewed. He also stated that everyone at this hospital from the time he came in has been pleasant and caring and taken very good care of him. Will continue to follow.

## 2018-05-08 NOTE — PROGRESS NOTES
2200 Dr. Harpal Arthur called for update, ok to do troponin now and call if elevated to Oncall staff   2300 notified [facility] critical tropnin of 146.00, pt c/o of chest pain with coughing states it from the ET tube tylenol given with some improvement   0200 pt having frequent non-productive coughing causing pain the upper chest neck area tylenol given will continue to monitor   0715 Bedside and Verbal shift change report given to Quincy Young (oncoming nurse) by Trinity Amado RN   (offgoing nurse). Report included the following information SBAR, Kardex, Procedure Summary, Intake/Output, MAR and Alarm Parameters .

## 2018-05-08 NOTE — ROUTINE PROCESS
Bedside and Verbal shift change report given to Rupert Alvarado RN  (oncoming nurse) by Tyra Yepez RN  (offgoing nurse). Report included the following information SBAR, Kardex, ED Summary, Procedure Summary, Intake/Output, MAR, Recent Results, Med Rec Status and Cardiac Rhythm SR with ST Depression .

## 2018-05-09 ENCOUNTER — APPOINTMENT (OUTPATIENT)
Dept: GENERAL RADIOLOGY | Age: 58
DRG: 247 | End: 2018-05-09
Attending: HOSPITALIST
Payer: SELF-PAY

## 2018-05-09 LAB
ALBUMIN SERPL-MCNC: 2.8 G/DL (ref 3.4–5)
ALBUMIN/GLOB SERPL: 1 {RATIO} (ref 0.8–1.7)
ALP SERPL-CCNC: 82 U/L (ref 45–117)
ALT SERPL-CCNC: 41 U/L (ref 16–61)
ANION GAP SERPL CALC-SCNC: 5 MMOL/L (ref 3–18)
AST SERPL-CCNC: 129 U/L (ref 15–37)
BASOPHILS # BLD: 0 K/UL (ref 0–0.06)
BASOPHILS NFR BLD: 0 % (ref 0–3)
BILIRUB SERPL-MCNC: 1.1 MG/DL (ref 0.2–1)
BUN SERPL-MCNC: 13 MG/DL (ref 7–18)
BUN/CREAT SERPL: 20 (ref 12–20)
CALCIUM SERPL-MCNC: 8.3 MG/DL (ref 8.5–10.1)
CHLORIDE SERPL-SCNC: 107 MMOL/L (ref 100–108)
CO2 SERPL-SCNC: 26 MMOL/L (ref 21–32)
CREAT SERPL-MCNC: 0.66 MG/DL (ref 0.6–1.3)
DIFFERENTIAL METHOD BLD: ABNORMAL
EOSINOPHIL # BLD: 0.1 K/UL (ref 0–0.4)
EOSINOPHIL NFR BLD: 1 % (ref 0–5)
ERYTHROCYTE [DISTWIDTH] IN BLOOD BY AUTOMATED COUNT: 13.3 % (ref 11.6–14.5)
GLOBULIN SER CALC-MCNC: 2.7 G/DL (ref 2–4)
GLUCOSE SERPL-MCNC: 92 MG/DL (ref 74–99)
HCT VFR BLD AUTO: 36.7 % (ref 36–48)
HGB BLD-MCNC: 12.4 G/DL (ref 13–16)
LYMPHOCYTES # BLD: 1.7 K/UL (ref 0.8–3.5)
LYMPHOCYTES NFR BLD: 18 % (ref 20–51)
MCH RBC QN AUTO: 28.9 PG (ref 24–34)
MCHC RBC AUTO-ENTMCNC: 33.8 G/DL (ref 31–37)
MCV RBC AUTO: 85.5 FL (ref 74–97)
MONOCYTES # BLD: 1.3 K/UL (ref 0–1)
MONOCYTES NFR BLD: 14 % (ref 2–9)
NEUTS SEG # BLD: 6.5 K/UL (ref 1.8–8)
NEUTS SEG NFR BLD: 67 % (ref 42–75)
PLATELET # BLD AUTO: 173 K/UL (ref 135–420)
PLATELET COMMENTS,PCOM: ABNORMAL
PMV BLD AUTO: 11.4 FL (ref 9.2–11.8)
POTASSIUM SERPL-SCNC: 4 MMOL/L (ref 3.5–5.5)
PROT SERPL-MCNC: 5.5 G/DL (ref 6.4–8.2)
RBC # BLD AUTO: 4.29 M/UL (ref 4.7–5.5)
RBC MORPH BLD: ABNORMAL
SODIUM SERPL-SCNC: 138 MMOL/L (ref 136–145)
WBC # BLD AUTO: 9.6 K/UL (ref 4.6–13.2)

## 2018-05-09 PROCEDURE — 85025 COMPLETE CBC W/AUTO DIFF WBC: CPT | Performed by: HOSPITALIST

## 2018-05-09 PROCEDURE — 74011250637 HC RX REV CODE- 250/637: Performed by: INTERNAL MEDICINE

## 2018-05-09 PROCEDURE — 74011250637 HC RX REV CODE- 250/637: Performed by: HOSPITALIST

## 2018-05-09 PROCEDURE — 80053 COMPREHEN METABOLIC PANEL: CPT | Performed by: HOSPITALIST

## 2018-05-09 PROCEDURE — 74011250636 HC RX REV CODE- 250/636: Performed by: INTERNAL MEDICINE

## 2018-05-09 PROCEDURE — 73120 X-RAY EXAM OF HAND: CPT

## 2018-05-09 PROCEDURE — 65660000004 HC RM CVT STEPDOWN

## 2018-05-09 PROCEDURE — 36415 COLL VENOUS BLD VENIPUNCTURE: CPT | Performed by: HOSPITALIST

## 2018-05-09 RX ORDER — SODIUM CHLORIDE 9 MG/ML
50 INJECTION, SOLUTION INTRAVENOUS CONTINUOUS
Status: DISPENSED | OUTPATIENT
Start: 2018-05-09 | End: 2018-05-09

## 2018-05-09 RX ADMIN — Medication 10 ML: at 21:13

## 2018-05-09 RX ADMIN — TICAGRELOR 90 MG: 90 TABLET ORAL at 11:44

## 2018-05-09 RX ADMIN — ACETAMINOPHEN 650 MG: 325 TABLET ORAL at 08:07

## 2018-05-09 RX ADMIN — ATORVASTATIN CALCIUM 80 MG: 40 TABLET, FILM COATED ORAL at 21:13

## 2018-05-09 RX ADMIN — Medication 10 ML: at 13:15

## 2018-05-09 RX ADMIN — SODIUM CHLORIDE 50 ML/HR: 900 INJECTION, SOLUTION INTRAVENOUS at 13:14

## 2018-05-09 RX ADMIN — Medication 10 ML: at 06:42

## 2018-05-09 RX ADMIN — CARVEDILOL 3.12 MG: 3.12 TABLET, FILM COATED ORAL at 21:13

## 2018-05-09 RX ADMIN — ASPIRIN 81 MG 81 MG: 81 TABLET ORAL at 08:07

## 2018-05-09 RX ADMIN — TICAGRELOR 90 MG: 90 TABLET ORAL at 23:49

## 2018-05-09 RX ADMIN — CARVEDILOL 3.12 MG: 3.12 TABLET, FILM COATED ORAL at 08:07

## 2018-05-09 NOTE — PROGRESS NOTES
The process for the requested Smart Vest has been started when interviewing this pt, pt reported that his address is [address], [address] phone #[phone]. This pt is agreeable to vest, and it's benefits. Pt is an indigent pt who will require financial assistance for this pt to receive this equipment. This writer will continue to pursue the necessary signatures for this prescription for this pt to receive this equipment.    The prescription will be provided for Jessica Novak NP for this pt's equipment request. Kaya Vallejo for vest completed this writer will now present this request to Hernandez Crawford R.N. Nursing Director for signature for this information request.

## 2018-05-09 NOTE — PROGRESS NOTES
2100 - Pt complains of pain at joint of left thumb. Thumb appears slightly swollen. Provided pillow for elevation. Offered tylenol, pt refused. IV in the [facility] area, phlebotomy monica blood earlier in left hand but not close to area he pointed to. Will monitor and let MD know in the AM.     2130 - Niece called for update. Provided and requested for someone to notify her if he gets a new room.  Gerard Beck [phone]

## 2018-05-09 NOTE — ROUTINE PROCESS
Bedside and Verbal shift change report given to Manda Buenrostro RN  (oncoming nurse) by Pola Alex RN  (offgoing nurse).  Report included the following information SBAR, Kardex, ED Summary, Procedure Summary, Intake/Output, MAR, Recent Results, Med Rec Status and Cardiac Rhythm SR.

## 2018-05-09 NOTE — PROGRESS NOTES
[facility] Hospitalist Group  Progress Note    Patient: Alysha Spicer Age: 59 y.o. : 1960 MR#: [id number] SSN: [id number]  Date/Time: 2018     Subjective: pt feels fine, no CP or SOB or dizziness. BP better now but was running on lower side this am       Assessment/Plan:   1. STEMI s/p LAD stenting: cont asa, brillanta, BB and statin  2. Sever cardiomyopathy due to # 1, well compensated. 3. Leukocytosis: possible stress induced,  UA and CXR neg. No Abx for now  4. Active tobacco abuse: adv on cessation   5. Abnormal LFT: AST worse than ALT: pt denies any ETOH abuse: ? Due # 1, improving   6. Low BP: agree with IVF and add holding parameters for coreg  D/w pt and RN, offered to talk to family but pt says he is been updating family, declined my offer        I spent 30 minutes with the patient in face-to-face consultation, of which greater than 50% was spent in counseling and coordination of care as described above. Case discussed with:  [x]Patient  []Family  [x]Nursing  []Case Management  DVT Prophylaxis:  []Lovenox  []Hep SQ  [x]SCDs  []Coumadin   []On Heparin gtt    Objective:   VS:   Visit Vitals    BP 93/73    Pulse 72    Temp 98.4 °F (36.9 °C)    Resp 19    Ht 5' 2\" (1.575 m)    Wt 53.3 kg (117 lb 6.4 oz)    SpO2 96%    BMI 21.47 kg/m2      Tmax/24hrs: Temp (24hrs), Av.4 °F (36.9 °C), Min:98.2 °F (36.8 °C), Max:98.5 °F (36.9 °C)  IOBRIEF    Intake/Output Summary (Last 24 hours) at 18 1544  Last data filed at 18 1245   Gross per 24 hour   Intake              480 ml   Output             1550 ml   Net            -1070 ml       General:  Alert, cooperative, no acute distress    Pulmonary:  CTA Bilaterally. Cardiovascular: Regular rate and Rhythm. GI:  Soft, Non distended, Non tender. + Bowel sounds. Extremities:  No edema, cyanosis, clubbing. No calf tenderness. Psych: Good insight. Not anxious or agitated. Neurologic: Alert and oriented X 3.  No acute neuro deficits.   R groin cath site clean     Medications:   Current Facility-Administered Medications   Medication Dose Route Frequency    0.9% sodium chloride infusion  50 mL/hr IntraVENous CONTINUOUS    carvedilol (COREG) tablet 3.125 mg  3.125 mg Oral Q12H    nitroglycerin (NITROSTAT) tablet 0.4 mg  0.4 mg SubLINGual PRN    sodium chloride (NS) flush 5-10 mL  5-10 mL IntraVENous Q8H    sodium chloride (NS) flush 5-10 mL  5-10 mL IntraVENous PRN    aspirin chewable tablet 81 mg  81 mg Oral DAILY    atorvastatin (LIPITOR) tablet 80 mg  80 mg Oral QHS    acetaminophen (TYLENOL) tablet 650 mg  650 mg Oral Q6H PRN    ondansetron (ZOFRAN) injection 4 mg  4 mg IntraVENous Q6H PRN    ticagrelor (BRILINTA) tablet 90 mg  90 mg Oral Q12H       Labs:    Recent Results (from the past 24 hour(s))   URINALYSIS W/ RFLX MICROSCOPIC    Collection Time: 05/08/18  7:00 PM   Result Value Ref Range    Color YELLOW      Appearance CLEAR      Specific gravity 1.019 1.005 - 1.030      pH (UA) 7.5 5.0 - 8.0      Protein NEGATIVE  NEG mg/dL    Glucose NEGATIVE  NEG mg/dL    Ketone NEGATIVE  NEG mg/dL    Bilirubin NEGATIVE  NEG      Blood NEGATIVE  NEG      Urobilinogen 4.0 (H) 0.2 - 1.0 EU/dL    Nitrites NEGATIVE  NEG      Leukocyte Esterase TRACE (A) NEG     URINE MICROSCOPIC ONLY    Collection Time: 05/08/18  7:00 PM   Result Value Ref Range    WBC 1 to 2 0 - 4 /hpf    RBC NONE 0 - 5 /hpf    Epithelial cells FEW 0 - 5 /lpf    Bacteria FEW (A) NEG /hpf   METABOLIC PANEL, COMPREHENSIVE    Collection Time: 05/09/18  6:15 AM   Result Value Ref Range    Sodium 138 136 - 145 mmol/L    Potassium 4.0 3.5 - 5.5 mmol/L    Chloride 107 100 - 108 mmol/L    CO2 26 21 - 32 mmol/L    Anion gap 5 3.0 - 18 mmol/L    Glucose 92 74 - 99 mg/dL    BUN 13 7.0 - 18 MG/DL    Creatinine 0.66 0.6 - 1.3 MG/DL    BUN/Creatinine ratio 20 12 - 20      GFR est AA >60 >60 ml/min/1.73m2    GFR est non-AA >60 >60 ml/min/1.73m2    Calcium 8.3 (L) 8.5 - 10.1 MG/DL    Bilirubin, total 1.1 (H) 0.2 - 1.0 MG/DL    ALT (SGPT) 41 16 - 61 U/L    AST (SGOT) 129 (H) 15 - 37 U/L    Alk. phosphatase 82 45 - 117 U/L    Protein, total 5.5 (L) 6.4 - 8.2 g/dL    Albumin 2.8 (L) 3.4 - 5.0 g/dL    Globulin 2.7 2.0 - 4.0 g/dL    A-G Ratio 1.0 0.8 - 1.7     CBC WITH AUTOMATED DIFF    Collection Time: 05/09/18  6:15 AM   Result Value Ref Range    WBC 9.6 4.6 - 13.2 K/uL    RBC 4.29 (L) 4.70 - 5.50 M/uL    HGB 12.4 (L) 13.0 - 16.0 g/dL    HCT 36.7 36.0 - 48.0 %    MCV 85.5 74.0 - 97.0 FL    MCH 28.9 24.0 - 34.0 PG    MCHC 33.8 31.0 - 37.0 g/dL    RDW 13.3 11.6 - 14.5 %    PLATELET 145 733 - 299 K/uL    MPV 11.4 9.2 - 11.8 FL    NEUTROPHILS 67 42 - 75 %    LYMPHOCYTES 18 (L) 20 - 51 %    MONOCYTES 14 (H) 2 - 9 %    EOSINOPHILS 1 0 - 5 %    BASOPHILS 0 0 - 3 %    ABS. NEUTROPHILS 6.5 1.8 - 8.0 K/UL    ABS. LYMPHOCYTES 1.7 0.8 - 3.5 K/UL    ABS. MONOCYTES 1.3 (H) 0 - 1.0 K/UL    ABS. EOSINOPHILS 0.1 0.0 - 0.4 K/UL    ABS.  BASOPHILS 0.0 0.0 - 0.06 K/UL    DF MANUAL      PLATELET COMMENTS ADEQUATE PLATELETS      RBC COMMENTS NORMOCYTIC, NORMOCHROMIC         Signed By: Benitez St MD     May 9, 2018

## 2018-05-09 NOTE — PROGRESS NOTES
Problem: Falls - Risk of  Goal: *Absence of Falls  Document Deysi Fall Risk and appropriate interventions in the flowsheet.    Outcome: Progressing Towards Goal  Fall Risk Interventions:            Medication Interventions: Patient to call before getting OOB, Teach patient to arise slowly    Elimination Interventions: Patient to call for help with toileting needs

## 2018-05-09 NOTE — PROGRESS NOTES
0730 Bedside and Verbal shift change report given to Deion (oncoming nurse) by Anayeli Mcmullen RN   (offgoing nurse). Report included the following information SBAR, Kardex, ED Summary, Procedure Summary, Intake/Output and Accordion.

## 2018-05-09 NOTE — PROGRESS NOTES
[facility]      CARDIOLOGY PROGRESS NOTE  RECS:    1. Acute anterior wall MI- ekg reveals ST elevation in anterior leads. S/P Cardiac catheterization with PCI/ LUKE to LAD. No further chest pain. Continue DAPT with Brilinta and aspirin, continue statin. BB on hold due to hypotension. OOB and ambulate. 2. Ischemic cardiomyopathy - EF 20-25% severe diffuse hypokinesis by echo. Will order life-vest for primary prevention. Irais from [facility] contacted. 3. Hypertension - BB on hold for hypotension. NS per orders. Watch for chf  4. Smoker: Patient advised to stop smoking to reduce future cardiovascular events.     Ok for stepdown    ASSESSMENT:  Hospital Problems  Never Reviewed          Codes Class Noted POA    STEMI (ST elevation myocardial infarction) ([facility] [address] (Chronic) ICD-10-CM: I21.3  ICD-9-CM: 410.90  5/7/2018 Unknown                SUBJECTIVE:  No CP or SOB    OBJECTIVE:    VS:   Visit Vitals    BP (!) 82/56    Pulse 82    Temp 98.4 °F (36.9 °C)    Resp 22    Ht 5' 2\" (1.575 m)    Wt 53.3 kg (117 lb 6.4 oz)    SpO2 96%    BMI 21.47 kg/m2         Intake/Output Summary (Last 24 hours) at 05/09/18 0954  Last data filed at 05/09/18 0706   Gross per 24 hour   Intake              240 ml   Output             1550 ml   Net            -1310 ml     TELE: normal sinus rhythm    General: alert and in no apparent distress  HENT: Normocephalic, atraumatic. Normal external eye. Neck :  no JVD  Cardiac:  regular rate and rhythm, S1, S2 normal, no murmur, click, rub or gallop   Lungs: clear to auscultation bilaterally - Diminished breath sounds. Abdomen: Soft, nontender, no masses  Extremities:  No c/c/e, peripheral pulses present; right groin cath site intact.       Labs: Results:       Chemistry Recent Labs      05/09/18   0615  05/08/18   0605  05/07/18   1129  05/07/18   1050   GLU  92   --   147*  86   NA  138   --   138  139   K  4.0   --   3.7  3.2*   CL  107   --   108  106   CO2 26   --   25  28   BUN  13   --   16  16   CREA  0.66   --   0.82  0.98   CA  8.3*   --   8.4*  9.1   AGAP  5   --   5  5   BUCR  20   --   20  16   AP  82  86   --    --    TP  5.5*  5.8*   --    --    ALB  2.8*  2.9*   --    --    GLOB  2.7  2.9   --    --    AGRAT  1.0  1.0   --    --       CBC w/Diff Recent Labs      05/09/18   0615  05/07/18   1129  05/07/18   1050   WBC  9.6  15.8*  17.1*   RBC  4.29*  4.69*  5.03   HGB  12.4*  14.0  15.3   HCT  36.7  39.5  43.2   PLT  173  265  280   GRANS  67   --   49   LYMPH  18*   --   33   EOS  1   --   2      Cardiac Enzymes No results for input(s): CPK, CKND1, TIKI in the last 72 hours. No lab exists for component: CKRMB, TROIP   Coagulation No results for input(s): PTP, INR, APTT in the last 72 hours. No lab exists for component: INREXT    Lipid Panel Lab Results   Component Value Date/Time    Cholesterol, total 136 05/08/2018 06:05 AM    HDL Cholesterol 47 05/08/2018 06:05 AM    LDL, calculated 71.4 05/08/2018 06:05 AM    VLDL, calculated 17.6 05/08/2018 06:05 AM    Triglyceride 88 05/08/2018 06:05 AM    CHOL/HDL Ratio 2.9 05/08/2018 06:05 AM      BNP No results for input(s): BNPP in the last 72 hours. Liver Enzymes Recent Labs      05/09/18   0615   TP  5.5*   ALB  2.8*   AP  82   SGOT  129*      Thyroid Studies No results found for: T4, T3U, TSH, TSHEXT           Patricia Huertas NP   Pager # [phone]  Patient seen independently  Discussed the details with NP and patient.  Please see orders & recommendations  Allan Machado MD

## 2018-05-10 VITALS
HEIGHT: 62 IN | DIASTOLIC BLOOD PRESSURE: 70 MMHG | WEIGHT: 117.4 LBS | SYSTOLIC BLOOD PRESSURE: 104 MMHG | RESPIRATION RATE: 18 BRPM | OXYGEN SATURATION: 98 % | TEMPERATURE: 97.7 F | HEART RATE: 86 BPM | BODY MASS INDEX: 21.6 KG/M2

## 2018-05-10 PROBLEM — I42.9 CARDIOMYOPATHY (HCC): Status: ACTIVE | Noted: 2018-05-10

## 2018-05-10 PROBLEM — I95.9 HYPOTENSION: Status: ACTIVE | Noted: 2018-05-10

## 2018-05-10 LAB
ALBUMIN SERPL-MCNC: 2.7 G/DL (ref 3.4–5)
ALBUMIN/GLOB SERPL: 0.8 {RATIO} (ref 0.8–1.7)
ALP SERPL-CCNC: 79 U/L (ref 45–117)
ALT SERPL-CCNC: 34 U/L (ref 16–61)
ANION GAP SERPL CALC-SCNC: 6 MMOL/L (ref 3–18)
AST SERPL-CCNC: 72 U/L (ref 15–37)
BILIRUB SERPL-MCNC: 0.7 MG/DL (ref 0.2–1)
BUN SERPL-MCNC: 12 MG/DL (ref 7–18)
BUN/CREAT SERPL: 17 (ref 12–20)
CALCIUM SERPL-MCNC: 8.1 MG/DL (ref 8.5–10.1)
CHLORIDE SERPL-SCNC: 109 MMOL/L (ref 100–108)
CO2 SERPL-SCNC: 25 MMOL/L (ref 21–32)
CREAT SERPL-MCNC: 0.69 MG/DL (ref 0.6–1.3)
GLOBULIN SER CALC-MCNC: 3.2 G/DL (ref 2–4)
GLUCOSE SERPL-MCNC: 92 MG/DL (ref 74–99)
POTASSIUM SERPL-SCNC: 3.8 MMOL/L (ref 3.5–5.5)
PROT SERPL-MCNC: 5.9 G/DL (ref 6.4–8.2)
SODIUM SERPL-SCNC: 140 MMOL/L (ref 136–145)

## 2018-05-10 PROCEDURE — 74011250637 HC RX REV CODE- 250/637: Performed by: INTERNAL MEDICINE

## 2018-05-10 PROCEDURE — 80053 COMPREHEN METABOLIC PANEL: CPT | Performed by: HOSPITALIST

## 2018-05-10 PROCEDURE — 36415 COLL VENOUS BLD VENIPUNCTURE: CPT | Performed by: HOSPITALIST

## 2018-05-10 PROCEDURE — 74011250637 HC RX REV CODE- 250/637: Performed by: HOSPITALIST

## 2018-05-10 RX ORDER — CARVEDILOL 3.12 MG/1
3.12 TABLET ORAL EVERY 12 HOURS
Qty: 60 TAB | Refills: 0 | Status: SHIPPED | OUTPATIENT
Start: 2018-05-10 | End: 2018-06-07 | Stop reason: SDUPTHER

## 2018-05-10 RX ORDER — GUAIFENESIN 100 MG/5ML
81 LIQUID (ML) ORAL DAILY
Qty: 30 TAB | Refills: 0 | Status: SHIPPED | OUTPATIENT
Start: 2018-05-11

## 2018-05-10 RX ORDER — ENALAPRIL MALEATE 2.5 MG/1
2.5 TABLET ORAL DAILY
Qty: 30 TAB | Refills: 0 | Status: SHIPPED | OUTPATIENT
Start: 2018-05-11 | End: 2018-06-07 | Stop reason: SDUPTHER

## 2018-05-10 RX ORDER — ATORVASTATIN CALCIUM 80 MG/1
80 TABLET, FILM COATED ORAL
Qty: 30 TAB | Refills: 0 | Status: SHIPPED | OUTPATIENT
Start: 2018-05-10 | End: 2018-06-07 | Stop reason: SDUPTHER

## 2018-05-10 RX ORDER — ENALAPRIL MALEATE 2.5 MG/1
2.5 TABLET ORAL DAILY
Status: DISCONTINUED | OUTPATIENT
Start: 2018-05-10 | End: 2018-05-10 | Stop reason: HOSPADM

## 2018-05-10 RX ADMIN — TICAGRELOR 90 MG: 90 TABLET ORAL at 11:27

## 2018-05-10 RX ADMIN — CARVEDILOL 3.12 MG: 3.12 TABLET, FILM COATED ORAL at 08:29

## 2018-05-10 RX ADMIN — Medication 10 ML: at 05:41

## 2018-05-10 RX ADMIN — ENALAPRIL MALEATE 2.5 MG: 2.5 TABLET ORAL at 15:51

## 2018-05-10 RX ADMIN — ASPIRIN 81 MG 81 MG: 81 TABLET ORAL at 08:29

## 2018-05-10 NOTE — PROGRESS NOTES
Problem: Falls - Risk of  Goal: *Absence of Falls  Document Deysi Fall Risk and appropriate interventions in the flowsheet.    Outcome: Progressing Towards Goal  Fall Risk Interventions:            Medication Interventions: Patient to call before getting OOB    Elimination Interventions: Patient to call for help with toileting needs

## 2018-05-10 NOTE — PROGRESS NOTES
Possible today, awaiting life vest arrangement and cardio clearance. Spoke to cardio, awaiting in put.

## 2018-05-10 NOTE — DISCHARGE SUMMARY
Discharge Summary    Patient: Sabina Daley MRN: [id number]  CSN: [id number]    YOB: 1960  Age: 59 y.o. Sex: male    DOA: 5/7/2018 LOS:  LOS: 3 days   Discharge Date:      Admission Diagnoses: [facility]  [facility]I (ST elevation myocardial infarction) [facility])    Discharge Diagnoses:  PLEASE SEE DICTATION. Discharge Condition: Stable    PHYSICAL EXAM  Visit Vitals    /76    Pulse 80    Temp 97.2 °F (36.2 °C)    Resp 18    Ht 5' 2\" (1.575 m)    Wt 53.3 kg (117 lb 6.4 oz)    SpO2 96%    BMI 21.47 kg/m2       General: Alert, cooperative, no acute distress    Lungs:  CTA Bilaterally. No Wheezing/Rhonchi/Rales. Heart:  Regular rate and Rhythm. Abdomen: Soft, Non distended, Non tender. + Bowel sounds. Extremities: No edema/ cyanosis/ clubbing  Psych:   Good insight. Not anxious or agitated. Neurologic:  AA oriented X 3. Moves all extremities. Hospital Course: Please see dictation. code # [id number]. Discharge Medications:     Current Discharge Medication List      START taking these medications    Details   aspirin 81 mg chewable tablet Take 1 Tab by mouth daily. Qty: 30 Tab, Refills: 0      atorvastatin (LIPITOR) 80 mg tablet Take 1 Tab by mouth nightly. Qty: 30 Tab, Refills: 0      carvedilol (COREG) 3.125 mg tablet Take 1 Tab by mouth every twelve (12) hours. Qty: 60 Tab, Refills: 0      enalapril (VASOTEC) 2.5 mg tablet Take 1 Tab by mouth daily. Qty: 30 Tab, Refills: 0      ticagrelor (BRILINTA) 90 mg tablet Take 1 Tab by mouth every twelve (12) hours every twelve (12) hours. Qty: 60 Tab, Refills: 0           · It is important that you take the medication exactly as they are prescribed. · Keep your medication in the bottles provided by the pharmacist and keep a list of the medication names, dosages, and times to be taken in your wallet. · Do not take other medications without consulting your doctor. DIET:  Cardiac Diet    ACTIVITY: Activity as tolerated  · H2H for acute MI    ADDITIONAL INFORMATION: If you experience any of the following symptoms but not limited to Fever, chills, nausea, vomiting, diarrhea, change in mentation, falling, bleeding, shortness of breath, chest pain, please call your primary care physician or return to the emergency room if you cannot get hold of your doctor:     FOLLOW UP CARE:  PCP in 7-10 days. Please call and set up an appointment.   Dr. Loni Veliz, cardio in 2 week    Minutes spent on discharge: 40 minutes spent coordinating this discharge (review instructions/follow-up, prescriptions, preparing report for sign off)    Lucía Smith MD  5/10/2018 4:22 PM

## 2018-05-10 NOTE — PROGRESS NOTES
[facility]      CARDIOLOGY PROGRESS NOTE  RECS:      1. Acute anterior wall MI- ekg reveals ST elevation in anterior leads. S/P Cardiac catheterization with PCI/ LUKE to LAD. No further chest pain. Continue DAPT with Brilinta and aspirin, continue statin. 2. Ischemic cardiomyopathy - EF 20-25% severe diffuse hypokinesis by echo. Ordered  life-vest for primary prevention. Irais from [facility] contacted. 3. Hypertension borderline tolerating low dose of bb. Will add low dose of Vasotec and monitor. 4. Smoker: Patient advised to stop smoking to reduce future cardiovascular events.     Ok for discharge from cardiac view if patient stable post ambulation for 2-4 hours after the first dose of Vasotec, gets his LifeVest and his medications at least for the first month as he may not be able to afford. Follow up in office in 1-2 wks      ASSESSMENT:  Hospital Problems  Never Reviewed          Codes Class Noted POA    Cardiomyopathy ([facility] [address] ICD-10-CM: I42.9  ICD-9-CM: 425.4  5/10/2018 Unknown        Hypotension ICD-10-CM: I95.9  ICD-9-CM: 458.9  5/10/2018 Unknown        STEMI (ST elevation myocardial infarction) ([facility] [address] (Chronic) ICD-10-CM: I21.3  ICD-9-CM: 410.90  5/7/2018 Unknown                SUBJECTIVE:    No CP or SOB    OBJECTIVE:    VS:   Visit Vitals    /76 (BP 1 Location: Left arm)    Pulse 90    Temp 97.6 °F (36.4 °C)    Resp 20    Ht 5' 2\" (1.575 m)    Wt 53.3 kg (117 lb 6.4 oz)    SpO2 96%    BMI 21.47 kg/m2         Intake/Output Summary (Last 24 hours) at 05/10/18 0937  Last data filed at 05/10/18 0600   Gross per 24 hour   Intake           848.33 ml   Output             1525 ml   Net          -676.67 ml     TELE: normal sinus rhythm    General: alert and in no apparent distress  HENT: Normocephalic, atraumatic. Normal external eye.   Neck :  no JVD  Cardiac:  regular rate and rhythm, S1, S2 normal, no murmur, click, rub or gallop   Lungs: clear to auscultation bilaterally - Diminished breath sounds. Abdomen: Soft, nontender, no masses  Extremities:  No edema. peripheral pulses present; right groin cath site intact. Labs: Results:       Chemistry Recent Labs      05/10/18   0250  05/09/18   0615  05/08/18   0605  05/07/18   1129   GLU  92  92   --   147*   NA  140  138   --   138   K  3.8  4.0   --   3.7   CL  109*  107   --   108   CO2  25  26   --   25   BUN  12  13   --   16   CREA  0.69  0.66   --   0.82   CA  8.1*  8.3*   --   8.4*   AGAP  6  5   --   5   BUCR  17  20   --   20   AP  79  82  86   --    TP  5.9*  5.5*  5.8*   --    ALB  2.7*  2.8*  2.9*   --    GLOB  3.2  2.7  2.9   --    AGRAT  0.8  1.0  1.0   --       CBC w/Diff Recent Labs      05/09/18   0615  05/07/18   1129  05/07/18   1050   WBC  9.6  15.8*  17.1*   RBC  4.29*  4.69*  5.03   HGB  12.4*  14.0  15.3   HCT  36.7  39.5  43.2   PLT  173  265  280   GRANS  67   --   49   LYMPH  18*   --   33   EOS  1   --   2      Cardiac Enzymes No results for input(s): CPK, CKND1, TIKI in the last 72 hours. No lab exists for component: CKRMB, TROIP   Coagulation No results for input(s): PTP, INR, APTT in the last 72 hours. No lab exists for component: INREXT, INREXT    Lipid Panel Lab Results   Component Value Date/Time    Cholesterol, total 136 05/08/2018 06:05 AM    HDL Cholesterol 47 05/08/2018 06:05 AM    LDL, calculated 71.4 05/08/2018 06:05 AM    VLDL, calculated 17.6 05/08/2018 06:05 AM    Triglyceride 88 05/08/2018 06:05 AM    CHOL/HDL Ratio 2.9 05/08/2018 06:05 AM      BNP No results for input(s): BNPP in the last 72 hours. Liver Enzymes Recent Labs      05/10/18   0250   TP  5.9*   ALB  2.7*   AP  79   SGOT  72*      Thyroid Studies No results found for: T4, T3U, TSH, TSHEXT, TSHEXT           Isatu Bartlett NP-C  Patient seen independently  Discussed the details with NP and patient.  Please see orders & recommendations  Justin Sherman MD

## 2018-05-10 NOTE — CARDIO/PULMONARY
Cardiac rehab in to see patient. He was sitting in chair. He stated that he is doing ok today. He stated that he has no questions at the present time. Will continue to follow.

## 2018-05-10 NOTE — PROGRESS NOTES
I have reviewed discharge instructions with the patient. The patient verbalized understanding. Patient wearing lifevest, has lifevest supplies, also meds from pharmacy given and explained to patient. Educated patient on smoking cessation.

## 2018-05-10 NOTE — DISCHARGE INSTRUCTIONS
Discharge Instructions    Patient: Marla Chao MRN: [id number]  CSN: [id number]    YOB: 1960  Age: 59 y.o. Sex: male    DOA: 5/7/2018 LOS:  LOS: 3 days   Discharge Date:      DIET:  Cardiac Diet    ACTIVITY: Activity as tolerated  · H2H for acute MI    ADDITIONAL INFORMATION: If you experience any of the following symptoms but not limited to Fever, chills, nausea, vomiting, diarrhea, change in mentation, falling, bleeding, shortness of breath, chest pain, please call your primary care physician or return to the emergency room if you cannot get hold of your doctor:     FOLLOW UP CARE:  PCP in 7-10 days. Please call and set up an appointment. Dr. Cas Sanchez, cardio in 2 week      Halima Grimes MD  5/10/2018 4:18 PM             Heart Attack: Care Instructions  Your Care Instructions    A heart attack (myocardial infarction, or MI) occurs when one or more of the coronary arteries, which supply the heart with oxygen-rich blood, is blocked. A blockage usually occurs when plaque inside the artery breaks open and a blood clot forms in the artery. After a heart attack, you may be worried about your future. Over the next several weeks, your heart will start to heal. Though it can be hard to break old habits, you can prevent another heart attack by making some lifestyle changes and by taking medicines. You may use this information for ideas about what to do at home to speed your recovery. Follow-up care is a key part of your treatment and safety. Be sure to make and go to all appointments, and call your doctor if you are having problems. It's also a good idea to know your test results and keep a list of the medicines you take. How can you care for yourself at home? Activity  ? · Until your doctor says it is okay, do not do strenuous exercise. And do not lift, pull, or push anything heavy. Ask your doctor what types of activities are safe for you.    ? · If your doctor has not set you up with a cardiac rehabilitation (rehab) program, talk to him or her about whether that is right for you. Cardiac rehab includes supervised exercise. It also includes help with diet and lifestyle changes and emotional support. It may reduce your risk of future heart problems. ? · Increase your activities slowly. Take short rest breaks when you get tired. ? · If your doctor recommends it, get more exercise. Walking is a good choice. Bit by bit, increase the amount you walk every day. Try for at least 30 minutes on most days of the week. You also may want to swim, bike, or do other activities. Talk with your doctor before you start an exercise program to make sure it is safe for you. ? · Ask your doctor when you can drive, go back to work, and do other daily activities again. ? · You can have sex as soon as you feel ready for it. Often this means when you can easily walk around or climb stairs. Talk with your doctor if you have any concerns. If you are taking nitroglycerin, do not take erection-enhancing medicine such as sildenafil (Viagra), tadalafil (Cialis), or vardenafil (Levitra) . ? Lifestyle changes  ? · Do not smoke. Smoking increases your risk of another heart attack. If you need help quitting, talk to your doctor about stop-smoking programs and medicines. These can increase your chances of quitting for good. ? · Eat a heart-healthy diet that is low in saturated fat and salt, and is full of fruits, vegetables and whole-grains. Eat at least two servings of fish each week. You may get more details about how to eat healthy. But these tips can help you get started. ? · Stay at a healthy weight, or lose weight if you need to. Medicines  ? · Be safe with medicines. Take your medicines exactly as prescribed. Call your doctor if you think you are having a problem with your medicine. You will get more details on the specific medicines your doctor prescribes.  Do not stop taking your medicine unless your doctor tells you to. Not taking your medicine might raise your risk of having another heart attack. ? · You may need several medicines to help lower your risk of another heart attack. These include:  ¨ Blood pressure medicines such as angiotensin-converting enzyme (ACE) inhibitors, ARBs (angiotensin II receptor blockers), and beta-blockers. ¨ Cholesterol medicine called statins. ¨ Aspirin and other blood thinners. These prevent blood clots that can cause a heart attack. ? · If your doctor has given you nitroglycerin, keep it with you at all times. If you have angina symptoms, such as chest pain or pressure, sit down and rest. Take the first dose of nitroglycerin as directed. If symptoms get worse or are not getting better within 5 minutes, call 911 right away. Stay on the phone. The emergency  will tell you what to do. ? · Do not take any over-the-counter medicines, vitamins, or herbal products without talking to your doctor first.   ?Staying healthy  ? · Manage other health conditions such as high blood pressure and diabetes. ? · Avoid colds and flu. Get a pneumococcal vaccine shot. If you have had one before, ask your doctor whether you need another dose. Get the flu vaccine every year. If you must be around people with colds or flu, wash your hands often. ? · Be sure to tell your doctor about any angina symptoms you have had, even if they went away. Pay attention to your angina symptoms. Know what is typical for you and learn how to control it. Know when to call for help. ? · Talk to your family, friends, or a counselor about your feelings. It is normal to feel frightened, angry, hopeless, helpless, and even guilty. Talking openly about bad feelings can help you cope. If you have symptoms of depression, talk to your doctor. When should you call for help? Call 911 anytime you think you may need emergency care. For example, call if:  ? · You have symptoms of a heart attack.  These may include:  ¨ Chest pain or pressure, or a strange feeling in the chest.  ¨ Sweating. ¨ Shortness of breath. ¨ Nausea or vomiting. ¨ Pain, pressure, or a strange feeling in the back, neck, jaw, or upper belly or in one or both shoulders or arms. ¨ Lightheadedness or sudden weakness. ¨ A fast or irregular heartbeat. After you call 911, the  may tell you to chew 1 adult-strength or 2 to 4 low-dose aspirin. Wait for an ambulance. Do not try to drive yourself. ? · You have angina symptoms (such as chest pain or pressure) that do not go away with rest or are not getting better within 5 minutes after you take a dose of nitroglycerin. ? · You passed out (lost consciousness). ? · You feel like you are having another heart attack. ?Call your doctor now or seek immediate medical care if:  ? · You are having angina symptoms, such as chest pain or pressure, more often than usual, or the symptoms are different or worse than usual.   ? · You have new or increased shortness of breath. ? · You are dizzy or lightheaded, or you feel like you may faint. ? Watch closely for changes in your health, and be sure to contact your doctor if you have any problems. Where can you learn more? Go to http://corie-radha.info/. Enter [id number] in the search box to learn more about \"Heart Attack: Care Instructions. \"  Current as of: September 21, 2016  Content Version: 11.4  © 1402-1552 [employer]. Care instructions adapted under license by [employer] (which disclaims liability or warranty for this information). If you have questions about a medical condition or this instruction, always ask your healthcare professional. [place] [address] any warranty or liability for your use of this information. Reducing Heart Attack Risk With Daily Medicine: Care Instructions  Your Care Instructions    Heart disease is the number one cause of death.  If you are at risk for heart disease, there are many medicines that can reduce your risk. These include:  · ACE inhibitors. These are a type of blood pressure medicine. They can reduce the risk of heart attacks and strokes if you are at high risk. · Statin medicines. These lower cholesterol. They can also reduce the risk of heart disease and strokes. · Aspirin. It can help certain people lower their risk of a heart attack or stroke. · Beta-blocker medicines. These are a type of blood pressure and heart medicine. They can reduce the chance of early death if you have had a heart attack. All medicines can cause side effects. So it is important to understand the pros and cons of any medicine you take. It is also important to take your medicines exactly as your doctor tells you to. Follow-up care is a key part of your treatment and safety. Be sure to make and go to all appointments, and call your doctor if you are having problems. It's also a good idea to know your test results and keep a list of the medicines you take. ACE inhibitors  ACE (angiotensin-converting enzyme) inhibitors are used for three main reasons. They lower blood pressure, protect the kidneys, and prevent heart attacks and strokes. Examples include benazepril (Lotensin), lisinopril (Prinivil, Zestril), and ramipril (Altace). Before you start taking an ACE inhibitor, make sure your doctor knows if:  · You are taking a water pill (diuretic). · You are taking potassium pills or using salt substitutes. · You are pregnant or breastfeeding. · You have had a kidney transplant or other kidney problems. ACE inhibitors can cause side effects. Call your doctor right away if you have:  · Trouble breathing. · Swelling in your face, head, neck, or tongue. · Dizziness or lightheadedness. · A dry cough. Statins  Statins lower cholesterol. Examples include atorvastatin (Lipitor), lovastatin (Mevacor), pravastatin (Pravachol), and simvastatin (Zocor).   Before you start taking a statin, make sure your doctor knows if:  · You have had a kidney transplant or other kidney problems. · You have liver disease. · You take any other prescription medicine, over-the-counter medicine, vitamins, supplements, or herbal remedies. · You are pregnant or breastfeeding. Statins can cause side effects. Call your doctor right away if you have:  · New, severe muscle aches. · Brown urine. Aspirin  Taking an aspirin every day can lower your risk for a heart attack. A heart attack occurs when a blood vessel in the heart gets blocked. When this happens, oxygen can't get to the heart muscle, and part of the heart dies. Aspirin can help prevent blood clots that can block the blood vessels. Talk to your doctor before you start taking aspirin every day. He or she may recommend that you take one low-dose aspirin (81 mg) tablet each day, with a meal and a full glass of water. Taking aspirin isn't right for everyone. This is because it can cause serious bleeding. And you may not be able to use aspirin if you:  · Have asthma. · Have an ulcer or other stomach problem. · Take some other medicine (called a blood thinner) that prevents blood clots. · Are allergic to aspirin. Before having a surgery or procedure, tell your doctor or dentist that you take aspirin. He or she will tell you if you should stop taking aspirin beforehand. Make sure that you understand exactly what your doctor wants you to do. Aspirin can cause side effects. Call your doctor right away if you have:  · Unusual bleeding or bruising. · Nausea, vomiting, or heartburn. · Black or bloody stools. Beta-blockers  Beta-blockers are used for three main reasons. They lower blood pressure, relieve angina symptoms (such as chest pain or pressure), and reduce the chances of a second heart attack. They include atenolol (Tenormin), carvedilol (Coreg), and metoprolol (Lopressor).   Before you start taking a beta-blocker, make sure your doctor knows if you have:  · Severe asthma or frequent asthma attacks. · A very slow pulse (less than 55 beats a minute). Beta-blockers can cause side effects. Call your doctor right away if you have:  · Wheezing or trouble breathing. · Dizziness or lightheadedness. · Asthma that gets worse. When should you call for help? Watch closely for changes in your health, and be sure to contact your doctor if you have any problems. Where can you learn more? Go to http://corie-radha.info/. Enter R428 in the search box to learn more about \"Reducing Heart Attack Risk With Daily Medicine: Care Instructions. \"  Current as of: September 21, 2016  Content Version: 11.4  © 6169-4650 [employer]. Care instructions adapted under license by [employer] (which disclaims liability or warranty for this information). If you have questions about a medical condition or this instruction, always ask your healthcare professional. [place] [address] any warranty or liability for your use of this information. Learning About Benefits From Quitting Smoking  How does quitting smoking make you healthier? If you're thinking about quitting smoking, you may have a few reasons to be smoke-free. Your health may be one of them. · When you quit smoking, you lower your risks for cancer, lung disease, heart attack, stroke, blood vessel disease, and blindness from macular degeneration. · When you're smoke-free, you get sick less often, and you heal faster. You are less likely to get colds, flu, bronchitis, and pneumonia. · As a nonsmoker, you may find that your mood is better and you are less stressed. When and how will you feel healthier? Quitting has real health benefits that start from day 1 of being smoke-free. And the longer you stay smoke-free, the healthier you get and the better you feel. The first hours  · After just 20 minutes, your blood pressure and heart rate go down.  That means there's less stress on your heart and blood vessels. · Within 12 hours, the level of carbon monoxide in your blood drops back to normal. That makes room for more oxygen. With more oxygen in your body, you may notice that you have more energy than when you smoked. After 2 weeks  · Your lungs start to work better. · Your risk of heart attack starts to drop. After 1 month  · When your lungs are clear, you cough less and breathe deeper, so it's easier to be active. · Your sense of taste and smell return. That means you can enjoy food more than you have since you started smoking. Over the years  · After 1 year, your risk of heart disease is half what it would be if you kept smoking. · After 5 years, your risk of stroke starts to shrink. Within a few years after that, it's about the same as if you'd never smoked. · After 10 years, your risk of dying from lung cancer is cut by about half. And your risk for many other types of cancer is lower too. How would quitting help others in your life? When you quit smoking, you improve the health of everyone who now breathes in your smoke. · Their heart, lung, and cancer risks drop, much like yours. · They are sick less. For babies and small children, living smoke-free means they're less likely to have ear infections, pneumonia, and bronchitis. · If you're a woman who is or will be pregnant someday, quitting smoking means a healthier . · Children who are close to you are less likely to become adult smokers. Where can you learn more? Go to http://corie-radha.info/. Enter [id number] in the search box to learn more about \"Learning About Benefits From Quitting Smoking. \"  Current as of: 2017  Content Version: 11.4  © 3009-4592 [employer]. Care instructions adapted under license by [employer] (which disclaims liability or warranty for this information).  If you have questions about a medical condition or this instruction, always ask your healthcare professional. [place] [address] any warranty or liability for your use of this information. DISCHARGE SUMMARY from Nurse    PATIENT INSTRUCTIONS:    After general anesthesia or intravenous sedation, for 24 hours or while taking prescription Narcotics:  · Limit your activities  · Do not drive and operate hazardous machinery  · Do not make important personal or business decisions  · Do  not drink alcoholic beverages  · If you have not urinated within 8 hours after discharge, please contact your surgeon on call. Report the following to your surgeon:  · Excessive pain, swelling, redness or odor of or around the surgical area  · Temperature over 100.5  · Nausea and vomiting lasting longer than 4 hours or if unable to take medications  · Any signs of decreased circulation or nerve impairment to extremity: change in color, persistent  numbness, tingling, coldness or increase pain  · Any questions    What to do at Home:  Recommended activity: Activity as tolerated        *  Please give a list of your current medications to your Primary Care Provider. *  Please update this list whenever your medications are discontinued, doses are      changed, or new medications (including over-the-counter products) are added. *  Please carry medication information at all times in case of emergency situations. These are general instructions for a healthy lifestyle:    No smoking/ No tobacco products/ Avoid exposure to second hand smoke  Surgeon General's Warning:  Quitting smoking now greatly reduces serious risk to your health.     Obesity, smoking, and sedentary lifestyle greatly increases your risk for illness    A healthy diet, regular physical exercise & weight monitoring are important for maintaining a healthy lifestyle    You may be retaining fluid if you have a history of heart failure or if you experience any of the following symptoms:  Weight gain of 3 pounds or more overnight or 5 pounds in a week, increased swelling in our hands or feet or shortness of breath while lying flat in bed. Please call your doctor as soon as you notice any of these symptoms; do not wait until your next office visit. Recognize signs and symptoms of STROKE:    F-face looks uneven    A-arms unable to move or move unevenly    S-speech slurred or non-existent    T-time-call 911 as soon as signs and symptoms begin-DO NOT go       Back to bed or wait to see if you get better-TIME IS BRAIN. Warning Signs of HEART ATTACK     Call 911 if you have these symptoms:   Chest discomfort. Most heart attacks involve discomfort in the center of the chest that lasts more than a few minutes, or that goes away and comes back. It can feel like uncomfortable pressure, squeezing, fullness, or pain.  Discomfort in other areas of the upper body. Symptoms can include pain or discomfort in one or both arms, the back, neck, jaw, or stomach.  Shortness of breath with or without chest discomfort.  Other signs may include breaking out in a cold sweat, nausea, or lightheadedness. Don't wait more than five minutes to call 911 - MINUTES MATTER! Fast action can save your life. Calling 911 is almost always the fastest way to get lifesaving treatment. Emergency Medical Services staff can begin treatment when they arrive -- up to an hour sooner than if someone gets to the hospital by car. The discharge information has been reviewed with the patient. The patient verbalized understanding. Discharge medications reviewed with the patient and appropriate educational materials and side effects teaching were provided.   ___________________________________________________________________________________________________________________________________

## 2018-05-10 NOTE — PROGRESS NOTES
FELI NOTE: SW made the referral to [facility] for [facility] for acute MI.     Trang Johnston, DOREEN LSW   [phone] (pager)

## 2018-05-11 ENCOUNTER — HOME HEALTH ADMISSION (OUTPATIENT)
Dept: HOME HEALTH SERVICES | Facility: HOME HEALTH | Age: 58
End: 2018-05-11

## 2018-05-11 NOTE — HOME CARE
Received [facility] referral; pt discharged yesterday, [facility] will follow for [facility] for Acute MI, confirmed pt's address and contact number with pt this morning. MINDI ALLRED.

## 2018-05-12 NOTE — DISCHARGE SUMMARY
[address] [place]  MR#: [id number]  : 1960  ACCOUNT #: [de-identified]   ADMIT DATE: 2018  DISCHARGE DATE: 05/10/2018    DISPOSITION:  Discharged home with home healthcare. DISCHARGE CONDITION:  Stable. DISCHARGE DIAGNOSES:  1.  ST elevation myocardial infarction, status post left anterior descending stenting. 2.  Severe cardiomyopathy, ejection fraction of 20-25%. 3.  Leukocytosis, possible stress induced, resolved now. 4.  Active tobacco use, counseled on cessation. 5.  Abnormal LFTs, possibly from his ST elevation myocardial infarction, improving now. 6.  Low normal blood pressure. DISCHARGE MEDICATIONS:  Aspirin 81 mg daily, Lipitor 80 mg at bedtime, Coreg 3.125 mg b.i.d., enalapril 2.5 mg daily, Brilinta 90 mg p.o. every 12 hours. MAJOR PROCEDURES IN THE HOSPITAL:  Patient underwent emergent cardiac catheterization and a drug-eluting stent was placed in the LAD. The patient tolerated the procedure without any problem. Post procedure, the patient was transferred to the cardiac ICU. Patient was monitored closely in the cardiac ICU. Blood pressure was around the lower side, but did not require pressors. Patient became asymptomatic post stent placement. Patient was started on a low dose of beta blocker. With that the pressure still remained on the lower side. Cardiology recommended IV fluids for a few hours, after which blood pressure stabilized. Patient was also slowly started on Vasotec in the hospital, which he also tolerated that too. Patient's blood pressure remained in the low 100s. Patient remained asymptomatic and the echo showed very low ejection fraction. Cardiology recommended Osvaldo Adame was arranged by . Patient does not have insurance, so is having a hard time getting his medications, so indigent medications were arranged.   Patient was noted to ambulate in the hallway, did not have any chest pain.  He was deemed in medically stable condition. The patient will be discharged home. For discharge instructions, followup appointments and physical exam, please refer to the previous discharge summary. Patient alert, awake. Discussed with the patient discharge plan and followup appointments. Also discussed with him about strict diet compliance, smoking cessation and also followups and medication compliance. He completely understood and agreed with the plan. Questions and concerns addressed appropriately. Patient will be following with the PCP.  has arranged PCP and also will be following with cardiology in 2 weeks.       Zeynep Vasquez MD       BT/MYRA  D: 05/11/2018 16:30     T: 05/12/2018 08:03  JOB #: [id number]  CC: Janis Jean MD  CC: Mohit Lyon MD

## 2018-05-14 ENCOUNTER — HOME CARE VISIT (OUTPATIENT)
Dept: SCHEDULING | Facility: HOME HEALTH | Age: 58
End: 2018-05-14

## 2018-05-14 PROCEDURE — G0299 HHS/HOSPICE OF RN EA 15 MIN: HCPCS

## 2018-05-17 ENCOUNTER — HOSPITAL ENCOUNTER (OUTPATIENT)
Dept: LAB | Age: 58
Discharge: HOME | End: 2018-05-17
Payer: SELF-PAY

## 2018-05-17 ENCOUNTER — OFFICE VISIT (OUTPATIENT)
Dept: FAMILY MEDICINE CLINIC | Age: 58
End: 2018-05-17

## 2018-05-17 VITALS
TEMPERATURE: 97.9 F | DIASTOLIC BLOOD PRESSURE: 68 MMHG | HEART RATE: 74 BPM | HEIGHT: 62 IN | SYSTOLIC BLOOD PRESSURE: 112 MMHG | WEIGHT: 126 LBS | RESPIRATION RATE: 16 BRPM | BODY MASS INDEX: 23.19 KG/M2 | OXYGEN SATURATION: 98 %

## 2018-05-17 DIAGNOSIS — Z87.891 FORMER SMOKER: ICD-10-CM

## 2018-05-17 DIAGNOSIS — R79.89 ELEVATED LFTS: ICD-10-CM

## 2018-05-17 DIAGNOSIS — I42.9 CARDIOMYOPATHY, UNSPECIFIED TYPE (HCC): ICD-10-CM

## 2018-05-17 DIAGNOSIS — I21.02 ST ELEVATION MYOCARDIAL INFARCTION INVOLVING LEFT ANTERIOR DESCENDING (LAD) CORONARY ARTERY (HCC): Primary | Chronic | ICD-10-CM

## 2018-05-17 LAB
ALBUMIN SERPL-MCNC: 3.2 G/DL (ref 3.4–5)
ALBUMIN/GLOB SERPL: 1.1 {RATIO} (ref 0.8–1.7)
ALP SERPL-CCNC: 108 U/L (ref 45–117)
ALT SERPL-CCNC: 26 U/L (ref 16–61)
ANION GAP SERPL CALC-SCNC: 7 MMOL/L (ref 3–18)
AST SERPL-CCNC: 16 U/L (ref 15–37)
BASOPHILS # BLD: 0 K/UL (ref 0–0.1)
BASOPHILS NFR BLD: 1 % (ref 0–2)
BILIRUB SERPL-MCNC: 0.6 MG/DL (ref 0.2–1)
BUN SERPL-MCNC: 14 MG/DL (ref 7–18)
BUN/CREAT SERPL: 16 (ref 12–20)
CALCIUM SERPL-MCNC: 8.4 MG/DL (ref 8.5–10.1)
CHLORIDE SERPL-SCNC: 104 MMOL/L (ref 100–108)
CO2 SERPL-SCNC: 27 MMOL/L (ref 21–32)
CREAT SERPL-MCNC: 0.9 MG/DL (ref 0.6–1.3)
DIFFERENTIAL METHOD BLD: ABNORMAL
EOSINOPHIL # BLD: 0.3 K/UL (ref 0–0.4)
EOSINOPHIL NFR BLD: 4 % (ref 0–5)
ERYTHROCYTE [DISTWIDTH] IN BLOOD BY AUTOMATED COUNT: 13 % (ref 11.6–14.5)
GLOBULIN SER CALC-MCNC: 2.9 G/DL (ref 2–4)
GLUCOSE SERPL-MCNC: 90 MG/DL (ref 74–99)
HCT VFR BLD AUTO: 35.9 % (ref 36–48)
HGB BLD-MCNC: 12.3 G/DL (ref 13–16)
LYMPHOCYTES # BLD: 1.7 K/UL (ref 0.9–3.6)
LYMPHOCYTES NFR BLD: 21 % (ref 21–52)
MCH RBC QN AUTO: 28.9 PG (ref 24–34)
MCHC RBC AUTO-ENTMCNC: 34.3 G/DL (ref 31–37)
MCV RBC AUTO: 84.5 FL (ref 74–97)
MONOCYTES # BLD: 1.3 K/UL (ref 0.05–1.2)
MONOCYTES NFR BLD: 16 % (ref 3–10)
NEUTS SEG # BLD: 4.8 K/UL (ref 1.8–8)
NEUTS SEG NFR BLD: 58 % (ref 40–73)
PLATELET # BLD AUTO: 317 K/UL (ref 135–420)
PMV BLD AUTO: 11 FL (ref 9.2–11.8)
POTASSIUM SERPL-SCNC: 4.6 MMOL/L (ref 3.5–5.5)
PROT SERPL-MCNC: 6.1 G/DL (ref 6.4–8.2)
RBC # BLD AUTO: 4.25 M/UL (ref 4.7–5.5)
SODIUM SERPL-SCNC: 138 MMOL/L (ref 136–145)
WBC # BLD AUTO: 8.1 K/UL (ref 4.6–13.2)

## 2018-05-17 PROCEDURE — 36415 COLL VENOUS BLD VENIPUNCTURE: CPT | Performed by: FAMILY MEDICINE

## 2018-05-17 PROCEDURE — 85025 COMPLETE CBC W/AUTO DIFF WBC: CPT | Performed by: FAMILY MEDICINE

## 2018-05-17 PROCEDURE — 80053 COMPREHEN METABOLIC PANEL: CPT | Performed by: FAMILY MEDICINE

## 2018-05-17 NOTE — PROGRESS NOTES
Candice Hoyt, 59 y.o.,  male    SUBJECTIVE  Establish care, has not seen PCP in years. Hospital ff-up for STEMI    Pt presented with diaphoresis, found to have STEMI s/p LAD stent 5/27/18. Reviewed discharge summary, med rec. ICM w/  EF 20-25%. Initially with hypotension which have improved. Elevated LFts thought to be due to AMI. He says able to take medication, home health nurse has been instituted. He lives by himself, does ADLs. Has upcoming appt with cards dr. Lalita Webb in June  He denies cp, sob, diaphoresis. He is wearing lifevest, some discomfort but compliant. He has quit smoking (previously 1/2 ppd for 50 years) and drinking after hospital discharge    ROS:  See HPI, all others negative        Patient Active Problem List   Diagnosis Code    STEMI (ST elevation myocardial infarction) ([facility] I21.3    [facility]omy[facility] I42.9    Hypotension I95.9    Former smoker Z87.891    Elevated LFTs R79.89       Current Outpatient Prescriptions   Medication Sig Dispense Refill    aspirin 81 mg chewable tablet Take 1 Tab by mouth daily. 30 Tab 0    atorvastatin (LIPITOR) 80 mg tablet Take 1 Tab by mouth nightly. 30 Tab 0    carvedilol (COREG) 3.125 mg tablet Take 1 Tab by mouth every twelve (12) hours. 60 Tab 0    enalapril (VASOTEC) 2.5 mg tablet Take 1 Tab by mouth daily. 30 Tab 0    ticagrelor (BRILINTA) 90 mg tablet Take 1 Tab by mouth every twelve (12) hours every twelve (12) hours. 61 Tab 0       No Known Allergies    Past Medical History:   Diagnosis Date    Hypercholesterolemia     Hypertension     STEMI (ST elevation myocardial infarction) [facility] 05/07/2018       Social History     Social History    Marital status: SINGLE     Spouse name: N/A    Number of children: N/A    Years of education: N/A     Occupational History    Not on file.      Social History Main Topics    Smoking status: Former Smoker     Quit date: 5/7/2018    Smokeless tobacco: Never Used    Alcohol use Yes      Comment: rarely    Drug use: No    Sexual activity: Not Currently     Partners: Female     Other Topics Concern    Not on file     Social History Narrative    No narrative on file       Family History   Problem Relation Age of Onset    Heart Disease Father          OBJECTIVE    Physical Exam:     Visit Vitals    /68 (BP 1 Location: Left arm, BP Patient Position: Sitting)    Pulse 74    Temp 97.9 °F (36.6 °C) (Oral)    Resp 16    Ht 5' 2\" (1.575 m)    Wt 126 lb (57.2 kg)    SpO2 98%    BMI 23.05 kg/m2       General: alert, , in no apparent distress or pain  Head: atraumatic. Non-tender maxillary and frontal sinuses  Eyes: Lids with no discharge, no matting, conjunctivae clear and non injected, full EOMs, PERLLA  Ears: pinna non-tender, external auditory canal patent, TM intact  Mouth/throat:tonsils non enlarged, pharynx non erythematous and no lesion, nasal mucosa normal  Neck: supple, no adenopathy palpated  CVS: normal rate, regular rhythm, distinct S1 and S2, wearing lifevest  Lungs:clear to ausculation bilaterally, no crackles, wheezing or rhonchi noted  Abdomen: normoactive bowel sounds, soft, non-tender  Extremities: no edema, no cyanosis,  Skin: warm, no lesions, rashes noted  Psych:  mood and affect normal        ASSESSMENT/PLAN  Diagnoses and all orders for this visit:    1. ST elevation myocardial infarction involving left anterior descending (LAD) coronary artery (HCC)  S/p LAD stent 5/7/18  EF 20-25% on lifevest  Asymptomatic, normotensive  Pt on ASA, brilinta, bb, ace, statin  Keep appt in dr. Maria Luisa Damon    2. Cardiomyopathy, unspecified type ([facility]  -     CBC WITH AUTOMATED DIFF; Future    3. Former smoker  Commended on quitting    4. Elevated LFTs  -     METABOLIC PANEL, COMPREHENSIVE; Future    Follow-up Disposition:  Return in about 4 weeks (around 6/14/2018), or if symptoms worsen or fail to improve. Patient understands plan of care. Patient has provided input and agrees with goals.

## 2018-05-17 NOTE — MR AVS SNAPSHOT
[address] [facility] [address] [facility] 
[phone] Patient: Ronda Ray MRN: [id number] VYO:[birth year] Visit Information Date & Time Provider Department Dept. Phone Encounter #  
 5/17/2018  1:15 PM Lulla Leyden, [address] [place] [address] Road [id number] Follow-up Instructions Return in about 4 weeks (around 6/14/2018), or if symptoms worsen or fail to improve. Your Appointments 6/1/2018  8:45 AM  
Office Visit with Lois Guzman MD  
[facility] Appt Note: h/f  
 [address] [facility] [address]  
  
   
 [phone] [address] [address] Upcoming Health Maintenance Date Due Hepatitis C Screening 1960 Pneumococcal 19-64 Medium Risk (1 of 1 - PPSV23) 5/19/1977 DTaP/Tdap/Td series (1 - Tdap) 5/19/1979 FOBT Q 1 YEAR AGE 50-75 5/19/2008 ZOSTER VACCINE AGE 60> 3/19/2018 Influenza Age 5 to Adult 8/1/2018 Allergies as of 5/17/2018  Review Complete On: 5/17/2018 By: Lulla Leyden, MD  
 No Known Allergies Current Immunizations  Never Reviewed No immunizations on file. Not reviewed this visit You Were Diagnosed With   
  
 Codes Comments ST elevation myocardial infarction involving left anterior descending (LAD) coronary artery (HCC)    -  Primary ICD-10-CM: I21.02 
ICD-9-CM: 410.10 Cardiomyopathy, unspecified type ([facility]     ICD-10-CM: I42.9 ICD-9-CM: 425.4 Former smoker     ICD-10-CM: N59.421 ICD-9-CM: V15.82 Elevated LFTs     ICD-10-CM: R79.89 ICD-9-CM: 790.6 Vitals BP Pulse Temp Resp Height(growth percentile) Weight(growth percentile) 112/68 (BP 1 Location: Left arm, BP Patient Position: Sitting) 74 97.9 °F (36.6 °C) (Oral) 16 5' 2\" (1.575 m) 126 lb (57.2 kg) SpO2 BMI Smoking Status 98% 23.05 kg/m2 Former Smoker Vitals History BMI and BSA Data Body Mass Index Body Surface Area 23.05 kg/m 2 1.58 m 2 Preferred Pharmacy Pharmacy Name Phone [phone] [facility] [place]  [phone] Your Updated Medication List  
  
   
This list is accurate as of 5/17/18  2:02 PM.  Always use your most recent med list.  
  
  
  
  
 aspirin 81 mg chewable tablet Take 1 Tab by mouth daily. atorvastatin 80 mg tablet Commonly known as:  LIPITOR Take 1 Tab by mouth nightly. carvedilol 3.125 mg tablet Commonly known as:  Willy Alcantara Take 1 Tab by mouth every twelve (12) hours. enalapril 2.5 mg tablet Commonly known as:  Sharonda Barn Take 1 Tab by mouth daily. ticagrelor 90 mg tablet Commonly known as:  [employer] Take 1 Tab by mouth every twelve (12) hours every twelve (12) hours. Follow-up Instructions Return in about 4 weeks (around 6/14/2018), or if symptoms worsen or fail to improve. To-Do List   
 05/17/2018 Lab:  CBC WITH AUTOMATED DIFF   
  
 05/17/2018 Lab:  METABOLIC PANEL, COMPREHENSIVE Patient Instructions Learning About Coronary Artery Disease (CAD) What is coronary artery disease? Coronary artery disease (CAD) occurs when plaque builds up in the arteries that bring oxygen-rich blood to your heart. Plaque is a fatty substance made of cholesterol, calcium, and other substances in the blood. This process is called hardening of the arteries, or atherosclerosis. What happens when you have coronary artery disease? · Plaque may narrow the coronary arteries. Narrowed arteries cause poor blood flow. This can lead to angina symptoms such as chest pain or discomfort. If blood flow is completely blocked, you could have a heart attack. · You can slow CAD and reduce the risk of future problems by making changes in your lifestyle. These include quitting smoking and eating heart-healthy foods. · Treatments for CAD, along with changes in your lifestyle, can help you live a longer and healthier life. How can you prevent coronary artery disease? · Do not smoke. It may be the best thing you can do to prevent heart disease. If you need help quitting, talk to your doctor about stop-smoking programs and medicines. These can increase your chances of quitting for good. · Be active. Get at least 30 minutes of exercise on most days of the week. Walking is a good choice. You also may want to do other activities, such as running, swimming, cycling, or playing tennis or team sports. · Eat heart-healthy foods. Eat more fruits and vegetables and less foods that contain saturated and trans fats. Limit alcohol, sodium, and sweets. · Stay at a healthy weight. Lose weight if you need to. · Manage other health problems such as diabetes, high blood pressure, and high cholesterol. · Manage stress. Stress can hurt your heart. To keep stress low, talk about your problems and feelings. Don't keep your feelings hidden. · If you have talked about it with your doctor, take a low-dose aspirin every day. Aspirin can help certain people lower their risk of a heart attack or stroke. But taking aspirin isn't right for everyone, because it can cause serious bleeding. Do not start taking daily aspirin unless your doctor knows about it. How is coronary artery disease treated? · Your doctor will suggest that you make lifestyle changes. For example, your doctor may ask you to eat healthy foods, quit smoking, lose extra weight, and be more active. · You will have to take medicines. · Your doctor may suggest a procedure to open narrowed or blocked arteries. This is called angioplasty. Or your doctor may suggest using healthy blood vessels to create detours around narrowed or blocked arteries. This is called bypass surgery. Follow-up care is a key part of your treatment and safety.  Be sure to make and go to all appointments, and call your doctor if you are having problems. It's also a good idea to know your test results and keep a list of the medicines you take. Where can you learn more? Go to http://corie-radha.info/. Enter (03) 3160 4782 in the search box to learn more about \"Learning About Coronary Artery Disease (CAD). \" Current as of: September 21, 2016 Content Version: 11.4 © 6162-0074 [employer]. Care instructions adapted under license by [employer] (which disclaims liability or warranty for this information). If you have questions about a medical condition or this instruction, always ask your healthcare professional. [address] any warranty or liability for your use of this information. Introducing [facility]! [facility] introduces [employer] patient portal. Now you can access parts of your medical record, email your doctor's office, and request medication refills online. 1. In your internet browser, go to [email or web address] 2. Click on the First Time User? Click Here link in the Sign In box. You will see the New Member Sign Up page. 3. Enter your [employer] Access Code exactly as it appears below. You will not need to use this code after youve completed the sign-up process. If you do not sign up before the expiration date, you must request a new code. · [employer] Access Code: [email or web address] Expires: 8/8/2018  6:29 PM 
 
4. Enter the last four digits of your Social Security Number (xxxx) and Date of Birth (mm/dd/yyyy) as indicated and click Submit. You will be taken to the next sign-up page. 5. Create a [employer] ID. This will be your [employer] login ID and cannot be changed, so think of one that is secure and easy to remember. 6. Create a [employer] password. You can change your password at any time. 7. Enter your Password Reset Question and Answer.  This can be used at a later time if you forget your password. 8. Enter your e-mail address. You will receive e-mail notification when new information is available in [address] 9. Click Sign Up. You can now view and download portions of your medical record. 10. Click the Download Summary menu link to download a portable copy of your medical information. If you have questions, please visit the Frequently Asked Questions section of the [employer] website. Remember, [employer] is NOT to be used for urgent needs. For medical emergencies, dial 911. Now available from your iPhone and Android! Please provide this summary of care documentation to your next provider. Your primary care clinician is listed as Gaston Diaz. If you have any questions after today's visit, please call [phone].

## 2018-05-17 NOTE — PATIENT INSTRUCTIONS
Learning About Coronary Artery Disease (CAD)  What is coronary artery disease? Coronary artery disease (CAD) occurs when plaque builds up in the arteries that bring oxygen-rich blood to your heart. Plaque is a fatty substance made of cholesterol, calcium, and other substances in the blood. This process is called hardening of the arteries, or atherosclerosis. What happens when you have coronary artery disease? · Plaque may narrow the coronary arteries. Narrowed arteries cause poor blood flow. This can lead to angina symptoms such as chest pain or discomfort. If blood flow is completely blocked, you could have a heart attack. · You can slow CAD and reduce the risk of future problems by making changes in your lifestyle. These include quitting smoking and eating heart-healthy foods. · Treatments for CAD, along with changes in your lifestyle, can help you live a longer and healthier life. How can you prevent coronary artery disease? · Do not smoke. It may be the best thing you can do to prevent heart disease. If you need help quitting, talk to your doctor about stop-smoking programs and medicines. These can increase your chances of quitting for good. · Be active. Get at least 30 minutes of exercise on most days of the week. Walking is a good choice. You also may want to do other activities, such as running, swimming, cycling, or playing tennis or team sports. · Eat heart-healthy foods. Eat more fruits and vegetables and less foods that contain saturated and trans fats. Limit alcohol, sodium, and sweets. · Stay at a healthy weight. Lose weight if you need to. · Manage other health problems such as diabetes, high blood pressure, and high cholesterol. · Manage stress. Stress can hurt your heart. To keep stress low, talk about your problems and feelings. Don't keep your feelings hidden. · If you have talked about it with your doctor, take a low-dose aspirin every day.  Aspirin can help certain people lower their risk of a heart attack or stroke. But taking aspirin isn't right for everyone, because it can cause serious bleeding. Do not start taking daily aspirin unless your doctor knows about it. How is coronary artery disease treated? · Your doctor will suggest that you make lifestyle changes. For example, your doctor may ask you to eat healthy foods, quit smoking, lose extra weight, and be more active. · You will have to take medicines. · Your doctor may suggest a procedure to open narrowed or blocked arteries. This is called angioplasty. Or your doctor may suggest using healthy blood vessels to create detours around narrowed or blocked arteries. This is called bypass surgery. Follow-up care is a key part of your treatment and safety. Be sure to make and go to all appointments, and call your doctor if you are having problems. It's also a good idea to know your test results and keep a list of the medicines you take. Where can you learn more? Go to http://corie-radha.info/. Enter (71) 8407 1409 in the search box to learn more about \"Learning About Coronary Artery Disease (CAD). \"  Current as of: September 21, 2016  Content Version: 11.4  © 2830-7132 [employer]. Care instructions adapted under license by [employer] (which disclaims liability or warranty for this information). If you have questions about a medical condition or this instruction, always ask your healthcare professional. [place] [address] any warranty or liability for your use of this information.

## 2018-05-18 NOTE — PROGRESS NOTES
Patient identified with 2 identifiers (name and ).   Patient aware of Liver function test back to normal. Hemoglobin is stable, these are reassuring

## 2018-05-21 ENCOUNTER — TELEPHONE (OUTPATIENT)
Dept: CARDIAC REHAB | Age: 58
End: 2018-05-21

## 2018-05-21 NOTE — TELEPHONE ENCOUNTER
Card[facility] Rehab called patient and left a message about the program. Additional attempts at contact will be made.      Thank you,  Nelly Escobedo

## 2018-06-06 ENCOUNTER — TELEPHONE (OUTPATIENT)
Dept: CARDIAC REHAB | Age: 58
End: 2018-06-06

## 2018-06-06 NOTE — TELEPHONE ENCOUNTER
[facility] called patient and spoke to him about the program. He declined services because he is currently in [place] taking care of family and he is not sure when he will return.     Thank you,  Rose Alexandre

## 2018-06-07 RX ORDER — ATORVASTATIN CALCIUM 80 MG/1
80 TABLET, FILM COATED ORAL
Qty: 30 TAB | Refills: 0 | Status: SHIPPED | OUTPATIENT
Start: 2018-06-07 | End: 2018-07-05 | Stop reason: SDUPTHER

## 2018-06-07 RX ORDER — CARVEDILOL 3.12 MG/1
3.12 TABLET ORAL EVERY 12 HOURS
Qty: 60 TAB | Refills: 0 | Status: SHIPPED | OUTPATIENT
Start: 2018-06-07 | End: 2018-07-05 | Stop reason: SDUPTHER

## 2018-06-07 RX ORDER — ENALAPRIL MALEATE 2.5 MG/1
2.5 TABLET ORAL DAILY
Qty: 30 TAB | Refills: 0 | Status: SHIPPED | OUTPATIENT
Start: 2018-06-07 | End: 2018-07-05 | Stop reason: SDUPTHER

## 2018-06-07 NOTE — TELEPHONE ENCOUNTER
Patient was scheduled for a follow up on 6/1/18 but patient had to cancel because he had to go to [place] due to his mother not doing well at all. He states that he should be home in 2-3 weeks and once he gets back home he will call to make a follow up.

## 2018-07-05 RX ORDER — ENALAPRIL MALEATE 2.5 MG/1
TABLET ORAL
Qty: 30 TAB | Refills: 0 | Status: SHIPPED | OUTPATIENT
Start: 2018-07-05 | End: 2020-06-12 | Stop reason: SDDI

## 2018-07-05 RX ORDER — ATORVASTATIN CALCIUM 80 MG/1
TABLET, FILM COATED ORAL
Qty: 30 TAB | Refills: 0 | Status: SHIPPED | OUTPATIENT
Start: 2018-07-05 | End: 2020-06-12

## 2018-07-05 RX ORDER — CARVEDILOL 3.12 MG/1
TABLET ORAL
Qty: 60 TAB | Refills: 0 | Status: SHIPPED | OUTPATIENT
Start: 2018-07-05 | End: 2020-06-12 | Stop reason: SDDI

## 2018-07-05 RX ORDER — TICAGRELOR 90 MG/1
TABLET ORAL
Qty: 60 TAB | Refills: 0 | Status: SHIPPED | OUTPATIENT
Start: 2018-07-05 | End: 2020-06-12 | Stop reason: SDDI

## 2020-06-12 ENCOUNTER — OFFICE VISIT (OUTPATIENT)
Dept: CARDIOLOGY CLINIC | Age: 60
End: 2020-06-12

## 2020-06-12 VITALS
WEIGHT: 129.4 LBS | TEMPERATURE: 98.3 F | SYSTOLIC BLOOD PRESSURE: 128 MMHG | DIASTOLIC BLOOD PRESSURE: 91 MMHG | BODY MASS INDEX: 23.81 KG/M2 | HEIGHT: 62 IN | HEART RATE: 83 BPM

## 2020-06-12 DIAGNOSIS — E78.00 HYPERCHOLESTEREMIA: ICD-10-CM

## 2020-06-12 DIAGNOSIS — F10.11 HISTORY OF ALCOHOL ABUSE: ICD-10-CM

## 2020-06-12 DIAGNOSIS — I25.5 ISCHEMIC CARDIOMYOPATHY: ICD-10-CM

## 2020-06-12 DIAGNOSIS — Z95.5 HISTORY OF CORONARY ARTERY STENT PLACEMENT: ICD-10-CM

## 2020-06-12 DIAGNOSIS — I25.119 CORONARY ARTERY DISEASE INVOLVING NATIVE CORONARY ARTERY OF NATIVE HEART WITH ANGINA PECTORIS (HCC): ICD-10-CM

## 2020-06-12 DIAGNOSIS — I50.23 ACUTE ON CHRONIC SYSTOLIC CONGESTIVE HEART FAILURE (HCC): Primary | ICD-10-CM

## 2020-06-12 DIAGNOSIS — Z71.6 TOBACCO ABUSE COUNSELING: ICD-10-CM

## 2020-06-12 RX ORDER — CARVEDILOL 3.12 MG/1
3.12 TABLET ORAL 2 TIMES DAILY WITH MEALS
Qty: 180 TAB | Refills: 1 | Status: SHIPPED | OUTPATIENT
Start: 2020-06-12 | End: 2021-02-23 | Stop reason: SDUPTHER

## 2020-06-12 RX ORDER — CARVEDILOL 3.12 MG/1
3.12 TABLET ORAL 2 TIMES DAILY WITH MEALS
Qty: 180 TAB | Refills: 1 | Status: SHIPPED | OUTPATIENT
Start: 2020-06-12 | End: 2020-06-12 | Stop reason: SDUPTHER

## 2020-06-12 RX ORDER — FUROSEMIDE 20 MG/1
20 TABLET ORAL
Qty: 30 TAB | Refills: 1 | Status: SHIPPED | OUTPATIENT
Start: 2020-06-12 | End: 2020-08-06 | Stop reason: ALTCHOICE

## 2020-06-12 RX ORDER — FUROSEMIDE 20 MG/1
20 TABLET ORAL
Qty: 30 TAB | Refills: 1 | Status: SHIPPED | OUTPATIENT
Start: 2020-06-12 | End: 2020-06-12 | Stop reason: SDUPTHER

## 2020-06-12 RX ORDER — ATORVASTATIN CALCIUM 80 MG/1
TABLET, FILM COATED ORAL
Qty: 90 TAB | Refills: 1 | Status: SHIPPED | OUTPATIENT
Start: 2020-06-12 | End: 2020-06-12 | Stop reason: SDUPTHER

## 2020-06-12 RX ORDER — ATORVASTATIN CALCIUM 80 MG/1
TABLET, FILM COATED ORAL
Qty: 90 TAB | Refills: 1 | Status: SHIPPED | OUTPATIENT
Start: 2020-06-12 | End: 2021-02-23 | Stop reason: SDUPTHER

## 2020-06-12 NOTE — PROGRESS NOTES
HISTORY OF PRESENT ILLNESS  Kaila Underwood is a 56 y.o. male. 6/20 has swollen rt testicle also    Chest Pain (Angina)    The history is provided by the patient. This is a recurrent problem. The current episode started more than 1 week ago (1 wk). The pain is associated with rest and normal activity (usually lying down). The pain is present in the substernal region. The quality of the pain is described as burning and dull. The pain does not radiate. Associated symptoms include shortness of breath. Pertinent negatives include no claudication, no cough, no dizziness, no fever, no headaches, no malaise/fatigue, no nausea, no orthopnea, no palpitations, no PND and no vomiting. Shortness of Breath   The history is provided by the patient. This is a new problem. The problem occurs intermittently. The current episode started more than 1 week ago (12/19). The problem has been gradually worsening. Associated symptoms include chest pain. Pertinent negatives include no fever, no headaches, no cough, no wheezing, no PND, no orthopnea, no vomiting, no rash, no leg swelling and no claudication. The problem's precipitants include exercise (50 ft). He has tried nothing for the symptoms. Review of Systems   Constitutional: Negative for chills, fever, malaise/fatigue and weight loss. HENT: Negative for nosebleeds. Eyes: Negative for discharge. Respiratory: Positive for shortness of breath. Negative for cough and wheezing. Cardiovascular: Positive for chest pain. Negative for palpitations, orthopnea, claudication, leg swelling and PND. Gastrointestinal: Negative for diarrhea, nausea and vomiting. Genitourinary: Negative for dysuria and hematuria. Musculoskeletal: Negative for joint pain. Skin: Negative for rash. Neurological: Negative for dizziness, seizures, loss of consciousness and headaches. Endo/Heme/Allergies: Negative for polydipsia. Does not bruise/bleed easily.    Psychiatric/Behavioral: Negative for depression and substance abuse. The patient does not have insomnia. No Known Allergies    Past Medical History:   Diagnosis Date    Hypercholesterolemia     STEMI (ST elevation myocardial infarction) ([facility] [address] 2018       Family History   Problem Relation Age of Onset    Heart Disease Father     Heart Attack Father 39    Heart Surgery Brother 79    Heart Surgery Sister 46    Stroke Neg Hx        Social History     Tobacco Use    Smoking status: Current Every Day Smoker     Packs/day: 0.05     Last attempt to quit: 2018     Years since quittin.1    Smokeless tobacco: Never Used    Tobacco comment: 2 cigarettes a day;  1 cig/day   Substance Use Topics    Alcohol use: Yes     Comment: 1 beer every 2-3 days    Drug use: No        Current Outpatient Medications   Medication Sig    atorvastatin (LIPITOR) 80 mg tablet TAKE 1 TABLET BY MOUTH EVERY DAY IN THE EVENING  Indications: high cholesterol    carvediloL (COREG) 3.125 mg tablet Take 1 Tab by mouth two (2) times daily (with meals).  furosemide (LASIX) 20 mg tablet Take 1 Tab by mouth daily as needed (shortness of breath).  aspirin 81 mg chewable tablet Take 1 Tab by mouth daily. No current facility-administered medications for this visit. Past Surgical History:   Procedure Laterality Date    CARDIAC SURG PROCEDURE UNLIST      MI       Visit Vitals  BP (!) 128/91   Pulse 83   Temp 98.3 °F (36.8 °C)   Ht 5' 2\" (1.575 m)   Wt 58.7 kg (129 lb 6.4 oz)   BMI 23.67 kg/m²       Diagnostic Studies:  I have reviewed the relevant tests done on the patient and show as follows  EKG tracings reviewed by me today.     EKG Results     Procedure 720 Value Units Date/Time    AMB POC EKG ROUTINE W/ 12 LEADS, INTER & REP [[id number]] Resulted:  20 1131    Order Status:  Completed Updated:  20 1126        XR Results (most recent):  Results from [facility] encounter on 18   XR HAND LT AP/LAT    Narrative EXAM: XR HAND LT AP/LAT    INDICATION:  pain in base of thumb    COMPARISON: None. FINDINGS:    3 views were obtained. The bony structures appeared intact. There is no  dislocation. Joint spaces are maintained. Soft tissues are unremarkable. Impression IMPRESSION:    No acute bony injuries. No significant joint space narrowing. Mr. Jose Duarte has a reminder for a \"due or due soon\" health maintenance. I have asked that he contact his primary care provider for follow-up on this health maintenance. Physical Exam   Constitutional: He is oriented to person, place, and time. He appears well-developed and well-nourished. No distress. HENT:   Head: Normocephalic and atraumatic. Mouth/Throat: Normal dentition. Eyes: Right eye exhibits no discharge. Left eye exhibits no discharge. No scleral icterus. Neck: Neck supple. JVD present. Carotid bruit is not present. No thyromegaly present. Cardiovascular: Normal rate, regular rhythm, S1 normal, S2 normal, normal heart sounds and intact distal pulses. Exam reveals no gallop and no friction rub. No murmur heard. Pulmonary/Chest: Effort normal and breath sounds normal. He has no wheezes. He has no rales. Abdominal: Soft. He exhibits no mass. There is no abdominal tenderness. Musculoskeletal:         General: Edema (1+) present. Lymphadenopathy:        Right cervical: No superficial cervical adenopathy present. Left cervical: No superficial cervical adenopathy present. Neurological: He is alert and oriented to person, place, and time. Skin: Skin is warm and dry. No rash noted. Psychiatric: He has a normal mood and affect. His behavior is normal.       ASSESSMENT and PLAN    Patient advised to stop smoking to reduce future cardiovascular events. CAD: Status post STEMI anterior 5/18  History of coronary stent: 5/18 LAD;    Patient has not been taking medications for quite some time it seems he is having angina as well as acute CHF.   Start Lasix low-dose beta-blockers and statins in addition to aspirin. Check echo and stress test as well as other labs as ordered. Follow-up after the testing. Diagnoses and all orders for this visit:    1. Acute on chronic systolic congestive heart failure (HCC)  -     furosemide (LASIX) 20 mg tablet; Take 1 Tab by mouth daily as needed (shortness of breath). -     ECHO ADULT COMPLETE; Future  -     METABOLIC PANEL, BASIC; Future  -     CBC W/O DIFF; Future  -     HEPATIC FUNCTION PANEL; Future  -     TSH 3RD GENERATION; Future    2. Ischemic cardiomyopathy  -     AMB POC EKG ROUTINE W/ 12 LEADS, INTER & REP  -     carvediloL (COREG) 3.125 mg tablet; Take 1 Tab by mouth two (2) times daily (with meals). 3. Coronary artery disease involving native coronary artery of native heart with angina pectoris (HCC)  -     atorvastatin (LIPITOR) 80 mg tablet; TAKE 1 TABLET BY MOUTH EVERY DAY IN THE EVENING  Indications: high cholesterol  -     carvediloL (COREG) 3.125 mg tablet; Take 1 Tab by mouth two (2) times daily (with meals). -     ECHO ADULT COMPLETE; Future  -     NUCLEAR CARDIAC STRESS TEST; Future    4. History of coronary artery stent placement  Comments:  5/18 LAD [facility]    5. Tobacco abuse counseling    6. History of alcohol abuse    7. Hypercholesteremia  -     atorvastatin (LIPITOR) 80 mg tablet; TAKE 1 TABLET BY MOUTH EVERY DAY IN THE EVENING  Indications: high cholesterol  -     LIPID PANEL; Future        Pertinent laboratory and test data reviewed and discussed with patient.   See patient instructions also for other medical advice given    Medications Discontinued During This Encounter   Medication Reason    atorvastatin (LIPITOR) 80 mg tablet     BRILINTA 90 mg tablet Non-Compliance    carvedilol (COREG) 3.125 mg tablet Non-Compliance    enalapril (VASOTEC) 2.5 mg tablet Non-Compliance       Follow-up and Dispositions    · Return in about 1 month (around 7/12/2020), or if symptoms worsen or fail to improve, for post test.

## 2020-06-12 NOTE — PROGRESS NOTES
1. Have you been to the ER, urgent care clinic since your last visit? Hospitalized since your last visit?     no    2. Have you seen or consulted any other health care providers outside of the [address] [place] [address] since your last visit? Include any pap smears or colon screening. No     3. Since your last visit, have you had any of the following symptoms? No cardiac symptoms        4. Have you had any blood work, X-rays or cardiac testing?    no         I    5.  Where do you normally have your labs drawn? [facility]    6. Do you need any refills today?    Patient hasn't taken any medicine since back in [place]

## 2020-06-12 NOTE — PATIENT INSTRUCTIONS
Medications Discontinued During This Encounter Medication Reason  atorvastatin (LIPITOR) 80 mg tablet  BRILINTA 90 mg tablet Non-Compliance  carvedilol (COREG) 3.125 mg tablet Non-Compliance  enalapril (VASOTEC) 2.5 mg tablet Non-Compliance Learning About Benefits From Quitting Smoking How does quitting smoking make you healthier? If you're thinking about quitting smoking, you may have a few reasons to be smoke-free. Your health may be one of them. · When you quit smoking, you lower your risks for cancer, lung disease, heart attack, stroke, blood vessel disease, and blindness from macular degeneration. · When you're smoke-free, you get sick less often, and you heal faster. You are less likely to get colds, flu, bronchitis, and pneumonia. · As a nonsmoker, you may find that your mood is better and you are less stressed. When and how will you feel healthier? Quitting has real health benefits that start from day 1 of being smoke-free. And the longer you stay smoke-free, the healthier you get and the better you feel. The first hours · After just 20 minutes, your blood pressure and heart rate go down. That means there's less stress on your heart and blood vessels. · Within 12 hours, the level of carbon monoxide in your blood drops back to normal. That makes room for more oxygen. With more oxygen in your body, you may notice that you have more energy than when you smoked. After 2 weeks · Your lungs start to work better. · Your risk of heart attack starts to drop. After 1 month · When your lungs are clear, you cough less and breathe deeper, so it's easier to be active. · Your sense of taste and smell return. That means you can enjoy food more than you have since you started smoking. Over the years · Over the years, your risks of heart disease, heart attack, and stroke are lower.  
· After 10 years, your risk of dying from lung cancer is cut by about half. And your risk for many other types of cancer is lower too. How would quitting help others in your life? When you quit smoking, you improve the health of everyone who now breathes in your smoke. · Their heart, lung, and cancer risks drop, much like yours. · They are sick less. For babies and small children, living smoke-free means they're less likely to have ear infections, pneumonia, and bronchitis. · If you're a woman who is or will be pregnant someday, quitting smoking means a healthier . · Children who are close to you are less likely to become adult smokers. Where can you learn more? Go to http://croie-radha.info/ Enter 052 [id number] 72 11 in the search box to learn more about \"Learning About Benefits From Quitting Smoking. \" Current as of: 2020               Content Version: 12.5 © 9470-8418 [employer]. Care instructions adapted under license by [employer] (which disclaims liability or warranty for this information). If you have questions about a medical condition or this instruction, always ask your healthcare professional. [address] any warranty or liability for your use of this information. Electronic Compliance Solutions Activation Thank you for requesting access to Electronic Compliance Solutions. Please follow the instructions below to securely access and download your online medical record. Electronic Compliance Solutions allows you to send messages to your doctor, view your test results, renew your prescriptions, schedule appointments, and more. How Do I Sign Up? 1. In your internet browser, go to [email or web address]. 2. Click on the First Time User? Click Here link in the Sign In box. You will see the New Member Sign Up page. 3. Enter your Electronic Compliance Solutions Access Code exactly as it appears below. You will not need to use this code after youve completed the sign-up process. If you do not sign up before the expiration date, you must request a new code. IMScouting Access Code: [email or web address] Expires: 2020 11:14 AM (This is the date your IMScouting access code will ) 4. Enter the last four digits of your Social Security Number (xxxx) and Date of Birth (mm/dd/yyyy) as indicated and click Submit. You will be taken to the next sign-up page. 5. Create a IMScouting ID. This will be your IMScouting login ID and cannot be changed, so think of one that is secure and easy to remember. 6. Create a IMScouting password. You can change your password at any time. 7. Enter your Password Reset Question and Answer. This can be used at a later time if you forget your password. 8. Enter your e-mail address. You will receive e-mail notification when new information is available in [address] 9. Click Sign Up. You can now view and download portions of your medical record. 10. Click the Download Summary menu link to download a portable copy of your medical information. Additional Information If you have questions, please visit the Frequently Asked Questions section of the IMScouting website at [email or web address] Remember, IMScouting is NOT to be used for urgent needs. For medical emergencies, dial 911.

## 2020-06-22 DIAGNOSIS — E78.00 HYPERCHOLESTEREMIA: ICD-10-CM

## 2020-06-22 DIAGNOSIS — I50.23 ACUTE ON CHRONIC SYSTOLIC CONGESTIVE HEART FAILURE (HCC): ICD-10-CM

## 2020-06-23 ENCOUNTER — TELEPHONE (OUTPATIENT)
Dept: CARDIOLOGY CLINIC | Age: 60
End: 2020-06-23

## 2020-06-23 NOTE — TELEPHONE ENCOUNTER
----- Message from Ayesha Vallejo MD sent at 6/23/2020  1:20 PM EDT -----  Reviewed and acceptable.   Lipids are well controlled

## 2020-08-06 ENCOUNTER — OFFICE VISIT (OUTPATIENT)
Dept: CARDIOLOGY CLINIC | Age: 60
End: 2020-08-06

## 2020-08-06 VITALS
BODY MASS INDEX: 21.53 KG/M2 | DIASTOLIC BLOOD PRESSURE: 69 MMHG | WEIGHT: 117 LBS | HEIGHT: 62 IN | HEART RATE: 68 BPM | TEMPERATURE: 97.2 F | SYSTOLIC BLOOD PRESSURE: 103 MMHG

## 2020-08-06 DIAGNOSIS — I25.119 CORONARY ARTERY DISEASE INVOLVING NATIVE CORONARY ARTERY OF NATIVE HEART WITH ANGINA PECTORIS (HCC): Primary | ICD-10-CM

## 2020-08-06 DIAGNOSIS — I25.5 ISCHEMIC CARDIOMYOPATHY: ICD-10-CM

## 2020-08-06 DIAGNOSIS — F10.11 HISTORY OF ALCOHOL ABUSE: ICD-10-CM

## 2020-08-06 DIAGNOSIS — Z71.6 TOBACCO ABUSE COUNSELING: ICD-10-CM

## 2020-08-06 DIAGNOSIS — Z95.5 HISTORY OF CORONARY ARTERY STENT PLACEMENT: ICD-10-CM

## 2020-08-06 NOTE — PROGRESS NOTES
1. Have you been to the ER, urgent care clinic since your last visit? Hospitalized since your last visit?     no  2. Have you seen or consulted any other health care providers outside of the [address] [place] [address] since your last visit? Include any pap smears or colon screening. No     3. Since your last visit, have you had any of the following symptoms? shortness of breath. 4.  Have you had any blood work, X-rays or cardiac testing? No         5. Where do you normally have your labs drawn? [facility]  6. Do you need any refills today?    no

## 2020-08-06 NOTE — PATIENT INSTRUCTIONS
Stop taking lasix Schedule stress test and echocardiogram. 
 
 
  
Stopping Smoking: Care Instructions Your Care Instructions Cigarette smokers crave the nicotine in cigarettes. Giving it up is much harder than simply changing a habit. Your body has to stop craving the nicotine. It is hard to quit, but you can do it. There are many tools that people use to quit smoking. You may find that combining tools works best for you. There are several steps to quitting. First you get ready to quit. Then you get support to help you. After that, you learn new skills and behaviors to become a nonsmoker. For many people, a necessary step is getting and using medicine. Your doctor will help you set up the plan that best meets your needs. You may want to attend a smoking cessation program to help you quit smoking. When you choose a program, look for one that has proven success. Ask your doctor for ideas. You will greatly increase your chances of success if you take medicine as well as get counseling or join a cessation program. 
Some of the changes you feel when you first quit tobacco are uncomfortable. Your body will miss the nicotine at first, and you may feel short-tempered and grumpy. You may have trouble sleeping or concentrating. Medicine can help you deal with these symptoms. You may struggle with changing your smoking habits and rituals. The last step is the tricky one: Be prepared for the smoking urge to continue for a time. This is a lot to deal with, but keep at it. You will feel better. Follow-up care is a key part of your treatment and safety. Be sure to make and go to all appointments, and call your doctor if you are having problems. It's also a good idea to know your test results and keep a list of the medicines you take. How can you care for yourself at home? · Ask your family, friends, and coworkers for support. You have a better chance of quitting if you have help and support. · Join a support group, such as Nicotine Anonymous, for people who are trying to quit smoking. · Consider signing up for a smoking cessation program, such as the American Lung Association's Freedom from Smoking program. 
· Get text messaging support. Go to the website at www.smokefree. gov to sign up for the [facility] program. 
· Set a quit date. Pick your date carefully so that it is not right in the middle of a big deadline or stressful time. Once you quit, do not even take a puff. Get rid of all ashtrays and lighters after your last cigarette. Clean your house and your clothes so that they do not smell of smoke. · Learn how to be a nonsmoker. Think about ways you can avoid those things that make you reach for a cigarette. ? Avoid situations that put you at greatest risk for smoking. For some people, it is hard to have a drink with friends without smoking. For others, they might skip a coffee break with coworkers who smoke. ? Change your daily routine. Take a different route to work or eat a meal in a different place. · Cut down on stress. Calm yourself or release tension by doing an activity you enjoy, such as reading a book, taking a hot bath, or gardening. · Talk to your doctor or pharmacist about nicotine replacement therapy, which replaces the nicotine in your body. You still get nicotine but you do not use tobacco. Nicotine replacement products help you slowly reduce the amount of nicotine you need. These products come in several forms, many of them available over-the-counter: ? Nicotine patches ? Nicotine gum and lozenges ? Nicotine inhaler · Ask your doctor about bupropion (Wellbutrin) or varenicline (Chantix), which are prescription medicines. They do not contain nicotine. They help you by reducing withdrawal symptoms, such as stress and anxiety. · Some people find hypnosis, acupuncture, and massage helpful for ending the smoking habit. · Eat a healthy diet and get regular exercise. Having healthy habits will help your body move past its craving for nicotine. · Be prepared to keep trying. Most people are not successful the first few times they try to quit. Do not get mad at yourself if you smoke again. Make a list of things you learned and think about when you want to try again, such as next week, next month, or next year. Where can you learn more? Go to http://corie-radha.info/ Enter F717 in the search box to learn more about \"Stopping Smoking: Care Instructions. \" Current as of: 2020               Content Version: 12.5 © 0947-3764 Wutsat Systems. Care instructions adapted under license by [employer] (which disclaims liability or warranty for this information). If you have questions about a medical condition or this instruction, always ask your healthcare professional. [place] [address] any warranty or liability for your use of this information. Air2Web Activation Thank you for requesting access to Air2Web. Please follow the instructions below to securely access and download your online medical record. Air2Web allows you to send messages to your doctor, view your test results, renew your prescriptions, schedule appointments, and more. How Do I Sign Up? 1. In your internet browser, go to [email or web address]. 2. Click on the First Time User? Click Here link in the Sign In box. You will see the New Member Sign Up page. 3. Enter your Air2Web Access Code exactly as it appears below. You will not need to use this code after youve completed the sign-up process. If you do not sign up before the expiration date, you must request a new code. Air2Web Access Code: [email or web address] Expires: 2020 10:31 AM (This is the date your Air2Web access code will ) 4.  Enter the last four digits of your Social Security Number (xxxx) and Date of Birth (mm/dd/yyyy) as indicated and click Submit. You will be taken to the next sign-up page. 5. Create a Drik ID. This will be your Drik login ID and cannot be changed, so think of one that is secure and easy to remember. 6. Create a Drik password. You can change your password at any time. 7. Enter your Password Reset Question and Answer. This can be used at a later time if you forget your password. 8. Enter your e-mail address. You will receive e-mail notification when new information is available in [address] 9. Click Sign Up. You can now view and download portions of your medical record. 10. Click the Download Summary menu link to download a portable copy of your medical information. Additional Information If you have questions, please visit the Frequently Asked Questions section of the Drik website at [email or web address] Remember, Drik is NOT to be used for urgent needs. For medical emergencies, dial 911. nonweight-bearing

## 2020-08-06 NOTE — PROGRESS NOTES
HISTORY OF PRESENT ILLNESS  Opal Mcintosh is a 56 y.o. male. 6/20 has swollen rt testicle also    8/2020 - Seen In f/u for CAD. At [facility] Stress test and echo were ordered, but he did not schedule due to confusion with insurance. Chest Pain (Angina)    The history is provided by the patient. This is a recurrent problem. The current episode started more than 1 week ago (1 wk). The problem has not changed since onset. The pain is associated with rest and normal activity (usually lying down). The pain is present in the substernal region. The quality of the pain is described as burning and dull. The pain does not radiate (left arm numbness). Associated symptoms include shortness of breath. Pertinent negatives include no claudication, no cough, no dizziness, no fever, no headaches, no malaise/fatigue, no nausea, no orthopnea, no palpitations, no PND and no vomiting. Risk factors include smoking/tobacco exposure, male gender and cardiac disease. Shortness of Breath   The history is provided by the patient. This is a new problem. The problem occurs intermittently. The current episode started more than 1 week ago (12/19). The problem has been gradually worsening. Associated symptoms include chest pain. Pertinent negatives include no fever, no headaches, no cough, no wheezing, no PND, no orthopnea, no vomiting, no rash, no leg swelling and no claudication. The problem's precipitants include exercise (50 ft). He has tried nothing for the symptoms. CHF   The history is provided by the patient and medical records. Associated symptoms include chest pain and shortness of breath. Pertinent negatives include no headaches. Cardiomyopathy   Associated symptoms include chest pain and shortness of breath. Pertinent negatives include no headaches. Review of Systems   Constitutional: Negative for chills, fever, malaise/fatigue and weight loss. HENT: Negative for nosebleeds. Eyes: Negative for discharge. Respiratory: Positive for shortness of breath. Negative for cough and wheezing. Cardiovascular: Positive for chest pain. Negative for palpitations, orthopnea, claudication, leg swelling and PND. Gastrointestinal: Negative for diarrhea, nausea and vomiting. Genitourinary: Negative for dysuria and hematuria. Musculoskeletal: Negative for joint pain. Skin: Negative for rash. Neurological: Negative for dizziness, seizures, loss of consciousness and headaches. Endo/Heme/Allergies: Negative for polydipsia. Does not bruise/bleed easily. Psychiatric/Behavioral: Negative for depression and substance abuse. The patient does not have insomnia. No Known Allergies    Past Medical History:   Diagnosis Date    Hypercholesterolemia     STEMI (ST elevation myocardial infarction) ([facility] [address] 2018       Family History   Problem Relation Age of Onset    Heart Disease Father     Heart Attack Father 39    Heart Surgery Brother 79    Heart Surgery Sister 46    Stroke Neg Hx        Social History     Tobacco Use    Smoking status: Current Every Day Smoker     Packs/day: 0.05     Last attempt to quit: 2018     Years since quittin.2    Smokeless tobacco: Never Used    Tobacco comment: 2 cigarettes a day;  1 cig/day   Substance Use Topics    Alcohol use: Yes     Comment: 1 beer every 2-3 days    Drug use: No        Current Outpatient Medications   Medication Sig    atorvastatin (LIPITOR) 80 mg tablet TAKE 1 TABLET BY MOUTH EVERY DAY IN THE EVENING  Indications: high cholesterol    carvediloL (COREG) 3.125 mg tablet Take 1 Tab by mouth two (2) times daily (with meals).  aspirin 81 mg chewable tablet Take 1 Tab by mouth daily. No current facility-administered medications for this visit.          Past Surgical History:   Procedure Laterality Date    CARDIAC SURG PROCEDURE UNLIST      MI       Visit Vitals  /69   Pulse 68   Temp 97.2 °F (36.2 °C) (Temporal)   Ht 5' 2\" (1.575 m)   Wt 53.1 kg (117 lb)   BMI 21.40 kg/m²       Diagnostic Studies:  I have reviewed the relevant tests done on the patient and show as follows  EKG tracings reviewed by me today. EKG Results     None        XR Results (most recent):  Results from Hospital Encounter encounter on 05/07/18   XR HAND LT AP/LAT    Narrative EXAM:  XR HAND LT AP/LAT    INDICATION:  pain in base of thumb    COMPARISON: None. FINDINGS:    3 views were obtained. The bony structures appeared intact. There is no  dislocation. Joint spaces are maintained. Soft tissues are unremarkable. Impression IMPRESSION:    No acute bony injuries. No significant joint space narrowing. Mr. Violet Rivas has a reminder for a \"due or due soon\" health maintenance. I have asked that he contact his primary care provider for follow-up on this health maintenance. Physical Exam   Constitutional: He is oriented to person, place, and time. He appears well-developed and well-nourished. No distress. HENT:   Head: Normocephalic and atraumatic. Mouth/Throat: Normal dentition. Eyes: Right eye exhibits no discharge. Left eye exhibits no discharge. No scleral icterus. Neck: Neck supple. No JVD present. Carotid bruit is not present. No thyromegaly present. Cardiovascular: Normal rate, regular rhythm, S1 normal, S2 normal, normal heart sounds and intact distal pulses. Exam reveals no gallop and no friction rub. No murmur heard. Pulmonary/Chest: Effort normal and breath sounds normal. He has no wheezes. He has no rales. Abdominal: Soft. He exhibits no mass. There is no abdominal tenderness. Musculoskeletal:         General: No edema. Lymphadenopathy:        Right cervical: No superficial cervical adenopathy present. Left cervical: No superficial cervical adenopathy present. Neurological: He is alert and oriented to person, place, and time. Skin: Skin is warm and dry. No rash noted. Psychiatric: He has a normal mood and affect.  His behavior is normal.       ASSESSMENT and PLAN    Lipid Complete PanelResulted: 6/19/2020 5:26 PM  [address]  Component Name Value Ref Range   Cholesterol 112 110 - 200 mg/dL   Triglyceride 57 40 - 149 mg/dL   HDL 37 (L) >=40 mg/dL   Cholesterol/HDL 3.0 0.0 - 5.0    Non-HDL Cholesterol 75 <130 mg/dL   LDL CALCULATION 64 50 - 99 mg/dL   VLDL CALCULATION 11 8 - 30 mg/dL   LDL/HDL Ratio 1.7      TSH 5.64    Diagnoses and all orders for this visit:    1. Coronary artery disease involving native coronary artery of native heart with angina pectoris ([facility] [address]    2. Ischemic cardiomyopathy    3. History of coronary artery stent placement    4. Tobacco abuse counseling    5. History of alcohol abuse    8/2020 Seen in f/u for CAD. C/O SOB with occasional chest pain and left arm numbness. This primarily occurs with rest and similar to symptoms prior to STEMI. Will evaluate with echo and stress test.  He was started on lasix at [address] [place] [address]. Edema has resolved. He c/o dizziness with weight loss. Will d/c lasix. Recent labs reviewed - LDL 64- continue Atorvastatin. TSH 5.64 - referred to PCP for f/u and to establish care. Pertinent laboratory and test data reviewed and discussed with patient. See patient instructions also for other medical advice given    Medications Discontinued During This Encounter   Medication Reason    furosemide (LASIX) 20 mg tablet Therapy Completed     Follow-up and Dispositions    · Return in about 1 month (around 9/6/2020) for Post testing. Patient advised to stop smoking to reduce future cardiovascular events. CAD: Status post STEMI anterior 5/18  History of coronary stent: 5/18 LAD;    Patient has not been taking medications for quite some time it seems he is having angina as well as acute CHF. Start Lasix low-dose beta-blockers and statins in addition to aspirin. Check echo and stress test as well as other labs as ordered. Follow-up after the testing.     Pertinent laboratory and test data reviewed and discussed with patient. See patient instructions also for other medical advice given    Medications Discontinued During This Encounter   Medication Reason    furosemide (LASIX) 20 mg tablet Therapy Completed       Follow-up and Dispositions    · Return in about 1 month (around 9/6/2020) for Post testing. I have independently evaluated and examined the patient. All relevant labs and testing data are reviewed. Care plan discussed and updated after review.   Ashly Lassiter MD

## 2020-10-01 ENCOUNTER — OFFICE VISIT (OUTPATIENT)
Dept: FAMILY MEDICINE CLINIC | Age: 60
End: 2020-10-01
Payer: MEDICARE

## 2020-10-01 VITALS
OXYGEN SATURATION: 97 % | RESPIRATION RATE: 16 BRPM | HEIGHT: 62 IN | WEIGHT: 117 LBS | DIASTOLIC BLOOD PRESSURE: 67 MMHG | BODY MASS INDEX: 21.53 KG/M2 | TEMPERATURE: 97.3 F | HEART RATE: 65 BPM | SYSTOLIC BLOOD PRESSURE: 105 MMHG

## 2020-10-01 DIAGNOSIS — Z72.0 TOBACCO ABUSE DISORDER: ICD-10-CM

## 2020-10-01 DIAGNOSIS — E03.9 HYPOTHYROIDISM, UNSPECIFIED TYPE: ICD-10-CM

## 2020-10-01 DIAGNOSIS — R91.8 OPACITY OF LUNG ON IMAGING STUDY: ICD-10-CM

## 2020-10-01 DIAGNOSIS — Z12.11 SCREEN FOR COLON CANCER: ICD-10-CM

## 2020-10-01 DIAGNOSIS — I25.5 ISCHEMIC CARDIOMYOPATHY: ICD-10-CM

## 2020-10-01 DIAGNOSIS — R93.89 ABNORMAL CHEST X-RAY: Primary | ICD-10-CM

## 2020-10-01 DIAGNOSIS — I25.119 CORONARY ARTERY DISEASE INVOLVING NATIVE CORONARY ARTERY OF NATIVE HEART WITH ANGINA PECTORIS (HCC): Primary | ICD-10-CM

## 2020-10-01 DIAGNOSIS — I21.02 ST ELEVATION MYOCARDIAL INFARCTION INVOLVING LEFT ANTERIOR DESCENDING (LAD) CORONARY ARTERY (HCC): Chronic | ICD-10-CM

## 2020-10-01 DIAGNOSIS — R05.9 COUGH: ICD-10-CM

## 2020-10-01 PROCEDURE — 90000 NO LOS: CPT | Performed by: FAMILY MEDICINE

## 2020-10-01 PROCEDURE — G8420 CALC BMI NORM PARAMETERS: HCPCS | Performed by: FAMILY MEDICINE

## 2020-10-01 PROCEDURE — 3017F COLORECTAL CA SCREEN DOC REV: CPT | Performed by: FAMILY MEDICINE

## 2020-10-01 PROCEDURE — G8427 DOCREV CUR MEDS BY ELIG CLIN: HCPCS | Performed by: FAMILY MEDICINE

## 2020-10-01 PROCEDURE — 99204 OFFICE O/P NEW MOD 45 MIN: CPT | Performed by: FAMILY MEDICINE

## 2020-10-01 PROCEDURE — G8510 SCR DEP NEG, NO PLAN REQD: HCPCS | Performed by: FAMILY MEDICINE

## 2020-10-01 RX ORDER — LEVOTHYROXINE SODIUM 25 UG/1
25 TABLET ORAL
Qty: 30 TAB | Refills: 2 | Status: SHIPPED | OUTPATIENT
Start: 2020-10-01 | End: 2021-02-23 | Stop reason: SDUPTHER

## 2020-10-01 NOTE — PROGRESS NOTES
Chief Complaint   Patient presents with   [address]     CAD,CHF,HTN      1. Have you been to the ER, urgent care clinic since your last visit? Hospitalized since your last visit? No    2. Have you seen or consulted any other health care providers outside of the [address] [place] [address] [place] since your last visit? Include any pap smears or colon screening. No     HPI  Garvin Cowden comes in to establish care. Cardiac: Patient has a history of coronary artery disease and has been followed up by the cardiologist.  He has a history of ST LOS, CHF and cardiomyopathy. Denies chest pain, shortness of breath or diaphoresis. He has been having episodes of chest discomfort and is scheduled to have a nuclear stress study done. He would like a referral for this as his insurance company will require it. I will place a referral for him to be seen by the cardiologist and to have this test done. Dyslipidemia: Patient has a history of dyslipidemia. He is on Lipitor and tolerating the medication. We will continue with the current treatment plan. Hypothyroidism: Patient had labs done that indicated elevated TSH. I will treat for hypothyroidism. I will start him on levothyroxine 25 mcg daily. I will recheck labs at next visit. Cough: Patient has cough that comes on and off. It is mainly in the morning. Is productive of yellow phlegm. He denies fever or chills. At times has noticed some blood-tinged to the sputum. Will order a chest x-ray. Patient denies wheeze. He has lost some weight since earlier this year. From the records he has lost about 12 pounds. Tobacco abuse disorder: Patient has been smoking for years. We discussed tobacco use disorder and the smoking cessation. He does need to quit the habit. He is not ready yet. Health maintenance: Patient needs to have colon cancer screening done. Referral to the gastroenterologist is placed.       Past Medical History  Past Medical History:   Diagnosis Date  Hypercholesterolemia     STEMI (ST elevation myocardial infarction) ([facility] [address] 2018       Surgical History  Past Surgical History:   Procedure Laterality Date    CARDIAC SURG PROCEDURE UNLIST      MI        Medications  Current Outpatient Medications   Medication Sig Dispense Refill    atorvastatin (LIPITOR) 80 mg tablet TAKE 1 TABLET BY MOUTH EVERY DAY IN THE EVENING  Indications: high cholesterol 90 Tab 1    carvediloL (COREG) 3.125 mg tablet Take 1 Tab by mouth two (2) times daily (with meals). 180 Tab 1    aspirin 81 mg chewable tablet Take 1 Tab by mouth daily. 30 Tab 0       Allergies  No Known Allergies    Family History  Family History   Problem Relation Age of Onset    Heart Disease Father     Heart Attack Father 39    Heart Surgery Brother 79    Heart Surgery Sister 46    Stroke Neg Hx        Social History  Social History     Socioeconomic History    Marital status: SINGLE     Spouse name: Not on file    Number of children: Not on file    Years of education: Not on file    Highest education level: Not on file   Occupational History    Not on file   Social Needs    Financial resource strain: Not on file    Food insecurity     Worry: Not on file     Inability: Not on file    Transportation needs     Medical: Not on file     Non-medical: Not on file   Tobacco Use    Smoking status: Current Every Day Smoker     Packs/day: 0.05     Last attempt to quit: 2018     Years since quittin.4    Smokeless tobacco: Never Used    Tobacco comment: 2 cigarettes a day;  1 cig/day   Substance and Sexual Activity    Alcohol use:  Yes     Alcohol/week: 1.0 standard drinks     Types: 1 Cans of beer per week     Frequency: 2-3 times a week     Comment: 1 beer every 2-3 days    Drug use: Yes     Types: Marijuana    Sexual activity: Not Currently     Partners: Female   Lifestyle    Physical activity     Days per week: Not on file     Minutes per session: Not on file    Stress: Not on file Relationships    Social connections     Talks on phone: Not on file     Gets together: Not on file     Attends Yazidi service: Not on file     Active member of club or organization: Not on file     Attends meetings of clubs or organizations: Not on file     Relationship status: Not on file    Intimate partner violence     Fear of current or ex partner: Not on file     Emotionally abused: Not on file     Physically abused: Not on file     Forced sexual activity: Not on file   Other Topics Concern    Not on file   Social History Narrative    Not on file       Review of Systems  Review of Systems - Review of all systems is negative except as noted above in the HPI.     Vital Signs  Visit Vitals  /67 (BP 1 Location: Left arm, BP Patient Position: Sitting)   Pulse 65   Temp 97.3 °F (36.3 °C) (Oral)   Resp 16   Ht 5' 2\" (1.575 m)   Wt 117 lb (53.1 kg)   SpO2 97%   BMI 21.40 kg/m²         Physical Exam  Physical Examination: General appearance - oriented to person, place, and time, acyanotic, in no respiratory distress and chronically ill appearing  Mental status - alert, oriented to person, place, and time, affect appropriate to mood  Neck - supple, no significant adenopathy  Lymphatics - no palpable lymphadenopathy, no hepatosplenomegaly  Chest - clear to auscultation, no wheezes, rales or rhonchi, symmetric air entry  Heart - S1 and S2 normal  Abdomen - no rebound tenderness noted  Back exam - limited range of motion  Neurological - motor and sensory grossly normal bilaterally  Musculoskeletal - osteoarthritic changes noted in both hands  Extremities - no pedal edema noted, intact peripheral pulses        Results  Results for orders placed or performed during the hospital encounter of 05/17/18   CBC WITH AUTOMATED DIFF   Result Value Ref Range    WBC 8.1 4.6 - 13.2 K/uL    RBC 4.25 (L) 4.70 - 5.50 M/uL    HGB 12.3 (L) 13.0 - 16.0 g/dL    HCT 35.9 (L) 36.0 - 48.0 %    MCV 84.5 74.0 - 97.0 FL    MCH 28.9 24.0 - 34.0 PG    MCHC 34.3 31.0 - 37.0 g/dL    RDW 13.0 11.6 - 14.5 %    PLATELET 440 111 - 433 K/uL    MPV 11.0 9.2 - 11.8 FL    NEUTROPHILS 58 40 - 73 %    LYMPHOCYTES 21 21 - 52 %    MONOCYTES 16 (H) 3 - 10 %    EOSINOPHILS 4 0 - 5 %    BASOPHILS 1 0 - 2 %    ABS. NEUTROPHILS 4.8 1.8 - 8.0 K/UL    ABS. LYMPHOCYTES 1.7 0.9 - 3.6 K/UL    ABS. MONOCYTES 1.3 (H) 0.05 - 1.2 K/UL    ABS. EOSINOPHILS 0.3 0.0 - 0.4 K/UL    ABS. BASOPHILS 0.0 0.0 - 0.1 K/UL    DF AUTOMATED     METABOLIC PANEL, COMPREHENSIVE   Result Value Ref Range    Sodium 138 136 - 145 mmol/L    Potassium 4.6 3.5 - 5.5 mmol/L    Chloride 104 100 - 108 mmol/L    CO2 27 21 - 32 mmol/L    Anion gap 7 3.0 - 18 mmol/L    Glucose 90 74 - 99 mg/dL    BUN 14 7.0 - 18 MG/DL    Creatinine 0.90 0.6 - 1.3 MG/DL    BUN/Creatinine ratio 16 12 - 20      GFR est AA >60 >60 ml/min/1.73m2    GFR est non-AA >60 >60 ml/min/1.73m2    Calcium 8.4 (L) 8.5 - 10.1 MG/DL    Bilirubin, total 0.6 0.2 - 1.0 MG/DL    ALT (SGPT) 26 16 - 61 U/L    AST (SGOT) 16 15 - 37 U/L    Alk. phosphatase 108 45 - 117 U/L    Protein, total 6.1 (L) 6.4 - 8.2 g/dL    Albumin 3.2 (L) 3.4 - 5.0 g/dL    Globulin 2.9 2.0 - 4.0 g/dL    A-G Ratio 1.1 0.8 - 1.7         ASSESSMENT and PLAN    ICD-10-CM ICD-9-CM    1. Coronary artery disease involving native coronary artery of native heart with angina pectoris ([facility] [address]  I25.119 414.01 REFERRAL TO CARDIOLOGY     413.9    2. Ischemic cardiomyopathy  I25.5 414.8 REFERRAL TO CARDIOLOGY   3. ST elevation myocardial infarction involving left anterior descending (LAD) coronary artery (HCC)  I21.02 410.10 REFERRAL TO CARDIOLOGY   4. Hypothyroidism, unspecified type  E03.9 244.9 levothyroxine (SYNTHROID) 25 mcg tablet   5. Tobacco abuse disorder  Z72.0 305.1    6.  Screen for colon cancer  Z12.11 V76.51 REFERRAL TO GASTROENTEROLOGY   7. Cough  R05 786.2 XR CHEST PA LAT     lab results and schedule of future lab studies reviewed with patient  reviewed diet, exercise and weight control  cardiovascular risk and specific lipid/LDL goals reviewed  reviewed medications and side effects in detail  radiology results and schedule of future radiology studies reviewed with patient      I have discussed the diagnosis with the patient and the intended plan of care as seen in the above orders. The patient has received an after-visit summary and questions were answered concerning future plans. I have discussed medication, side effects, and warnings with the patient in detail. The patient verbalized understanding and is in agreement with the plan of care. The patient will contact the office with any additional concerns. Fabricio Borges MD    PLEASE NOTE:   This document has been produced using voice recognition software.  Unrecognized errors in transcription may be present

## 2020-10-26 ENCOUNTER — OFFICE VISIT (OUTPATIENT)
Dept: CARDIOLOGY CLINIC | Age: 60
End: 2020-10-26
Payer: MEDICARE

## 2020-10-26 VITALS
HEIGHT: 62 IN | WEIGHT: 118 LBS | BODY MASS INDEX: 21.71 KG/M2 | HEART RATE: 82 BPM | TEMPERATURE: 97.6 F | SYSTOLIC BLOOD PRESSURE: 114 MMHG | DIASTOLIC BLOOD PRESSURE: 70 MMHG

## 2020-10-26 DIAGNOSIS — Z71.6 TOBACCO ABUSE COUNSELING: ICD-10-CM

## 2020-10-26 DIAGNOSIS — I25.5 ISCHEMIC CARDIOMYOPATHY: ICD-10-CM

## 2020-10-26 DIAGNOSIS — I25.119 CORONARY ARTERY DISEASE INVOLVING NATIVE CORONARY ARTERY OF NATIVE HEART WITH ANGINA PECTORIS (HCC): Primary | ICD-10-CM

## 2020-10-26 DIAGNOSIS — E78.00 HYPERCHOLESTEREMIA: ICD-10-CM

## 2020-10-26 DIAGNOSIS — Z95.5 HISTORY OF CORONARY ARTERY STENT PLACEMENT: ICD-10-CM

## 2020-10-26 PROCEDURE — 3017F COLORECTAL CA SCREEN DOC REV: CPT | Performed by: INTERNAL MEDICINE

## 2020-10-26 PROCEDURE — G8427 DOCREV CUR MEDS BY ELIG CLIN: HCPCS | Performed by: INTERNAL MEDICINE

## 2020-10-26 PROCEDURE — G8420 CALC BMI NORM PARAMETERS: HCPCS | Performed by: INTERNAL MEDICINE

## 2020-10-26 PROCEDURE — 99214 OFFICE O/P EST MOD 30 MIN: CPT | Performed by: INTERNAL MEDICINE

## 2020-10-26 PROCEDURE — G8432 DEP SCR NOT DOC, RNG: HCPCS | Performed by: INTERNAL MEDICINE

## 2020-10-26 NOTE — PROGRESS NOTES
1. Have you been to the ER, urgent care clinic since your last visit? Hospitalized since your last visit?     no  2. Have you seen or consulted any other health care providers outside of the [address] [place] [address] since your last visit? Include any pap smears or colon screening. No     3. Since your last visit, have you had any of the following symptoms? shortness of breath. 4.  Have you had any blood work, X-rays or cardiac testing? No         5. Where do you normally have your labs drawn? [facility]  6. Do you need any refills today?    no

## 2020-10-26 NOTE — PATIENT INSTRUCTIONS
There are no discontinued medications. Learning About Benefits From Quitting Smoking How does quitting smoking make you healthier? If you're thinking about quitting smoking, you may have a few reasons to be smoke-free. Your health may be one of them. · When you quit smoking, you lower your risks for cancer, lung disease, heart attack, stroke, blood vessel disease, and blindness from macular degeneration. · When you're smoke-free, you get sick less often, and you heal faster. You are less likely to get colds, flu, bronchitis, and pneumonia. · As a nonsmoker, you may find that your mood is better and you are less stressed. When and how will you feel healthier? Quitting has real health benefits that start from day 1 of being smoke-free. And the longer you stay smoke-free, the healthier you get and the better you feel. The first hours · After just 20 minutes, your blood pressure and heart rate go down. That means there's less stress on your heart and blood vessels. · Within 12 hours, the level of carbon monoxide in your blood drops back to normal. That makes room for more oxygen. With more oxygen in your body, you may notice that you have more energy than when you smoked. After 2 weeks · Your lungs start to work better. · Your risk of heart attack starts to drop. After 1 month · When your lungs are clear, you cough less and breathe deeper, so it's easier to be active. · Your sense of taste and smell return. That means you can enjoy food more than you have since you started smoking. Over the years · Over the years, your risks of heart disease, heart attack, and stroke are lower. · After 10 years, your risk of dying from lung cancer is cut by about half. And your risk for many other types of cancer is lower too. How would quitting help others in your life? When you quit smoking, you improve the health of everyone who now breathes in your smoke. · Their heart, lung, and cancer risks drop, much like yours. · They are sick less. For babies and small children, living smoke-free means they're less likely to have ear infections, pneumonia, and bronchitis. · If you're a woman who is or will be pregnant someday, quitting smoking means a healthier . · Children who are close to you are less likely to become adult smokers. Where can you learn more? Go to http://www.mercado.com/ Enter 052 806 72 11 in the search box to learn more about \"Learning About Benefits From Quitting Smoking. \" Current as of: 2020               Content Version: 12.6 © 5934-0812 [employer]. Care instructions adapted under license by [employer] (which disclaims liability or warranty for this information). If you have questions about a medical condition or this instruction, always ask your healthcare professional. [place] [address] any warranty or liability for your use of this information.

## 2020-10-26 NOTE — PROGRESS NOTES
HISTORY OF PRESENT ILLNESS  Shani Dupree is a 56 y.o. male. 6/20 has swollen rt testicle also    8/2020 - Seen In f/u for CAD. At [facility] Stress test and echo were ordered, but he did not schedule due to confusion with insurance. CHF   The history is provided by the patient and medical records. Associated symptoms include chest pain and shortness of breath. Pertinent negatives include no headaches. Cardiomyopathy   The history is provided by the medical records. This is a chronic problem. Associated symptoms include chest pain and shortness of breath. Pertinent negatives include no headaches. Chest Pain (Angina)    The history is provided by the patient. This is a recurrent problem. The current episode started more than 1 week ago (1 wk). The problem has not changed since onset. The pain is associated with rest and normal activity (usually lying down). The pain is present in the substernal region. The quality of the pain is described as burning and dull. The pain does not radiate (left arm numbness). Associated symptoms include shortness of breath. Pertinent negatives include no claudication, no cough, no dizziness, no fever, no headaches, no malaise/fatigue, no nausea, no orthopnea, no palpitations, no PND and no vomiting. Risk factors include smoking/tobacco exposure, male gender and cardiac disease. Shortness of Breath   The history is provided by the patient. This is a new problem. The problem occurs intermittently. The current episode started more than 1 week ago (12/19). The problem has not changed since onset. Associated symptoms include chest pain. Pertinent negatives include no fever, no headaches, no cough, no wheezing, no PND, no orthopnea, no vomiting, no rash, no leg swelling and no claudication. The problem's precipitants include exercise (50 ft; 10/20 1/8 mile; ). He has tried nothing for the symptoms.        Review of Systems   Constitutional: Negative for chills, fever, malaise/fatigue and weight loss. HENT: Negative for nosebleeds. Eyes: Negative for discharge. Respiratory: Positive for shortness of breath. Negative for cough and wheezing. Cardiovascular: Positive for chest pain. Negative for palpitations, orthopnea, claudication, leg swelling and PND. Gastrointestinal: Negative for diarrhea, nausea and vomiting. Genitourinary: Negative for dysuria and hematuria. Musculoskeletal: Negative for joint pain. Skin: Negative for rash. Neurological: Negative for dizziness, seizures, loss of consciousness and headaches. Endo/Heme/Allergies: Negative for polydipsia. Does not bruise/bleed easily. Psychiatric/Behavioral: Negative for depression and substance abuse. The patient does not have insomnia. No Known Allergies    Past Medical History:   Diagnosis Date    Hypercholesterolemia     STEMI (ST elevation myocardial infarction) ([facility] [address] 2018       Family History   Problem Relation Age of Onset    Heart Disease Father     Heart Attack Father 39    Heart Surgery Brother 79    Heart Surgery Sister 46    Stroke Neg Hx        Social History     Tobacco Use    Smoking status: Current Every Day Smoker     Packs/day: 0.05     Last attempt to quit: 2018     Years since quittin.4    Smokeless tobacco: Never Used    Tobacco comment: 2 cigarettes a day;  1 cig/day   Substance Use Topics    Alcohol use: Yes     Alcohol/week: 1.0 standard drinks     Types: 1 Cans of beer per week     Frequency: 2-3 times a week     Comment: 1 beer every 2-3 days    Drug use: Yes     Types: Marijuana        Current Outpatient Medications   Medication Sig    atorvastatin (LIPITOR) 80 mg tablet TAKE 1 TABLET BY MOUTH EVERY DAY IN THE EVENING  Indications: high cholesterol    carvediloL (COREG) 3.125 mg tablet Take 1 Tab by mouth two (2) times daily (with meals).  aspirin 81 mg chewable tablet Take 1 Tab by mouth daily.     levothyroxine (SYNTHROID) 25 mcg tablet Take 1 Tab by mouth Daily (before breakfast). No current facility-administered medications for this visit. Past Surgical History:   Procedure Laterality Date    CARDIAC SURG PROCEDURE UNLIST      MI       Visit Vitals  /70   Pulse 82   Temp 97.6 °F (36.4 °C) (Temporal)   Ht 5' 2\" (1.575 m)   Wt 53.5 kg (118 lb)   BMI 21.58 kg/m²       Diagnostic Studies:  I have reviewed the relevant tests done on the patient and show as follows  EKG tracings reviewed by me today. EKG Results     None        XR Results (most recent):  Results from Appointment encounter on 10/01/20   XR CHEST PA LAT    Narrative EXAM: XR CHEST PA LAT    INDICATION: chronic cough    COMPARISON: May 8, 2018. FINDINGS: PA and lateral radiographs of the chest demonstrate mild interstitial  opacities. Prominent cardiac silhouette, stable. Minimal aortic  atherosclerosis. . Lungs appear hyperlucent and hyperinflated which can be seen  in COPD. Mild prominence of perihilar markings. Osseous degenerative changes  with diffuse idiopathic skeletal hyperostosis. Lower thoracic vertebral body  height loss which may be chronic and/or degenerative. Correlate with point  tenderness. .       Impression IMPRESSION:     Mild bilateral interstitial and peribronchial opacities. Findings may be acute  and/or chronic. Mildly prominent cardiac silhouette. Please see report for additional findings and details. Mr. Raymundo Mustafa has a reminder for a \"due or due soon\" health maintenance. I have asked that he contact his primary care provider for follow-up on this health maintenance. Physical Exam   Constitutional: He is oriented to person, place, and time. He appears well-developed and well-nourished. No distress. HENT:   Head: Normocephalic and atraumatic. Mouth/Throat: Normal dentition. Eyes: Right eye exhibits no discharge. Left eye exhibits no discharge. No scleral icterus. Neck: Neck supple. No JVD present. Carotid bruit is not present.  No thyromegaly present. Cardiovascular: Normal rate, regular rhythm, S1 normal, S2 normal, normal heart sounds and intact distal pulses. Exam reveals no gallop and no friction rub. No murmur heard. Pulmonary/Chest: Effort normal and breath sounds normal. He has no wheezes. He has no rales. Abdominal: Soft. He exhibits no mass. There is no abdominal tenderness. Musculoskeletal:         General: No edema. Lymphadenopathy:        Right cervical: No superficial cervical adenopathy present. Left cervical: No superficial cervical adenopathy present. Neurological: He is alert and oriented to person, place, and time. Skin: Skin is warm and dry. No rash noted. Psychiatric: He has a normal mood and affect. His behavior is normal.       ASSESSMENT and PLAN    Lipid Complete PanelResulted: 6/19/2020 5:26 PM  [address]  Component Name Value Ref Range   Cholesterol 112 110 - 200 mg/dL   Triglyceride 57 40 - 149 mg/dL   HDL 37 (L) >=40 mg/dL   Cholesterol/HDL 3.0 0.0 - 5.0    Non-HDL Cholesterol 75 <130 mg/dL   LDL CALCULATION 64 50 - 99 mg/dL   VLDL CALCULATION 11 8 - 30 mg/dL   LDL/HDL Ratio 1.7      TSH 5.64    Seen in f/u for CAD. C/O SOB with occasional chest pain and left arm numbness. This primarily occurs with rest and similar to symptoms prior to STEMI. Will evaluate with echo and stress test.  He was started on lasix at [facility]. Edema has resolved. Diagnoses and all orders for this visit:    1. Coronary artery disease involving native coronary artery of native heart with angina pectoris (HCC)  -     NUCLEAR CARDIAC STRESS TEST; Future    2. Ischemic cardiomyopathy  -     ECHO ADULT COMPLETE; Future  -     NUCLEAR CARDIAC STRESS TEST; Future  -     METABOLIC PANEL, BASIC; Future  -     CBC W/O DIFF; Future    3. History of coronary artery stent placement    4. Tobacco abuse counseling    5. Hypercholesteremia  -     LIPID PANEL;  Future  -     HEPATIC FUNCTION PANEL; Future        Pertinent laboratory and test data reviewed and discussed with patient. See patient instructions also for other medical advice given    There are no discontinued medications.     Follow-up and Dispositions    · Return in about 3 months (around 1/26/2021), or if symptoms worsen or fail to improve, for post test.

## 2020-10-28 ENCOUNTER — HOSPITAL ENCOUNTER (OUTPATIENT)
Dept: CT IMAGING | Age: 60
Discharge: HOME | End: 2020-10-28
Attending: FAMILY MEDICINE
Payer: MEDICARE

## 2020-10-28 DIAGNOSIS — R91.8 OPACITY OF LUNG ON IMAGING STUDY: ICD-10-CM

## 2020-10-28 DIAGNOSIS — I25.5 ISCHEMIC CARDIOMYOPATHY: ICD-10-CM

## 2020-10-28 DIAGNOSIS — I25.119 CORONARY ARTERY DISEASE INVOLVING NATIVE CORONARY ARTERY OF NATIVE HEART WITH ANGINA PECTORIS (HCC): ICD-10-CM

## 2020-10-28 DIAGNOSIS — R93.89 ABNORMAL CHEST X-RAY: ICD-10-CM

## 2020-10-28 LAB — CREAT UR-MCNC: 0.8 MG/DL (ref 0.6–1.3)

## 2020-10-28 PROCEDURE — 74011000636 HC RX REV CODE- 636: Performed by: FAMILY MEDICINE

## 2020-10-28 PROCEDURE — 71260 CT THORAX DX C+: CPT

## 2020-10-28 PROCEDURE — 82565 ASSAY OF CREATININE: CPT

## 2020-10-28 RX ADMIN — IOPAMIDOL 80 ML: 612 INJECTION, SOLUTION INTRAVENOUS at 10:13

## 2020-10-29 ENCOUNTER — TELEPHONE (OUTPATIENT)
Dept: CARDIOLOGY CLINIC | Age: 60
End: 2020-10-29

## 2020-10-29 DIAGNOSIS — E78.00 HYPERCHOLESTEREMIA: ICD-10-CM

## 2020-10-29 DIAGNOSIS — I25.5 ISCHEMIC CARDIOMYOPATHY: ICD-10-CM

## 2020-10-29 NOTE — TELEPHONE ENCOUNTER
----- Message from Florencia Royal MD sent at 10/29/2020 10:14 AM EDT -----  Reviewed and acceptable

## 2020-11-05 ENCOUNTER — TELEPHONE (OUTPATIENT)
Dept: FAMILY MEDICINE CLINIC | Age: 60
End: 2020-11-05

## 2020-11-05 NOTE — TELEPHONE ENCOUNTER
Calling to get an insurance referral for testing. Not an office referral but thru insurance. Not really sure because first time they have had this happen. [employer],said testing needs insurance referral.   Tests are scheduled on Monday.   Please to discuss as soon as you can  [phone]

## 2020-11-06 ENCOUNTER — TELEPHONE (OUTPATIENT)
Dept: FAMILY MEDICINE CLINIC | Age: 60
End: 2020-11-06

## 2020-11-06 ENCOUNTER — DOCUMENTATION ONLY (OUTPATIENT)
Dept: FAMILY MEDICINE CLINIC | Age: 60
End: 2020-11-06

## 2020-11-06 NOTE — PROGRESS NOTES
Cardiology called again stating that this is something New that the PCP has to get Auth for the test to be done. They will have to cancel his test for Monday. Please give them a call as soon as possible to discuss as he needs these test done .

## 2020-11-17 NOTE — PROGRESS NOTES
Please let patient know that his CT scan of the chest showed various abnormalities within his lungs with areas that may represent pneumonia and may need further evaluation. He does need to come in for follow-up care. I have placed a referral for him to be seen by the pulmonologist.  He should get a follow-up by pulmonology.   Evelina Butcher MD

## 2020-11-19 NOTE — PROGRESS NOTES
Spoke with patient at this time. Patient verbalized understanding at this time.  Patient states he can't see the pulmonologist until 01/21

## 2020-11-20 ENCOUNTER — DOCUMENTATION ONLY (OUTPATIENT)
Dept: FAMILY MEDICINE CLINIC | Age: 60
End: 2020-11-20

## 2020-11-20 NOTE — PROGRESS NOTES
Attempted to call patient to make sure he had his results but no answer.   Reviewed to to see that his already has a follow up appt with Dr. Gaynell Cooks on Dec 9th and an appointment with Dr. Ramon Bethea with pulmonoolgy as well

## 2020-11-23 NOTE — PROGRESS NOTES
Please let patient know chest CT scan showed abnormalities within the left lung. He has been referred to see the pulmonologist for further evaluation and testing. He should make that appointment with the specialist for follow-up care. He will need follow-up chest CT to rule out any malignancy.   Also follow-up with his cardiologist.  Bennett Weinstein MD

## 2021-02-23 ENCOUNTER — OFFICE VISIT (OUTPATIENT)
Dept: FAMILY MEDICINE CLINIC | Age: 61
End: 2021-02-23

## 2021-02-23 VITALS
DIASTOLIC BLOOD PRESSURE: 64 MMHG | SYSTOLIC BLOOD PRESSURE: 104 MMHG | OXYGEN SATURATION: 97 % | RESPIRATION RATE: 18 BRPM | TEMPERATURE: 98 F | BODY MASS INDEX: 22.26 KG/M2 | HEIGHT: 62 IN | WEIGHT: 121 LBS | HEART RATE: 64 BPM

## 2021-02-23 DIAGNOSIS — E03.9 HYPOTHYROIDISM, UNSPECIFIED TYPE: ICD-10-CM

## 2021-02-23 DIAGNOSIS — Z00.00 MEDICARE ANNUAL WELLNESS VISIT, SUBSEQUENT: ICD-10-CM

## 2021-02-23 DIAGNOSIS — Z71.89 ADVANCED DIRECTIVES, COUNSELING/DISCUSSION: ICD-10-CM

## 2021-02-23 DIAGNOSIS — Z13.31 SCREENING FOR DEPRESSION: ICD-10-CM

## 2021-02-23 DIAGNOSIS — I25.119 CORONARY ARTERY DISEASE INVOLVING NATIVE CORONARY ARTERY OF NATIVE HEART WITH ANGINA PECTORIS (HCC): Primary | ICD-10-CM

## 2021-02-23 DIAGNOSIS — I25.5 ISCHEMIC CARDIOMYOPATHY: ICD-10-CM

## 2021-02-23 DIAGNOSIS — S69.91XA INJURY OF RIGHT THUMB, INITIAL ENCOUNTER: ICD-10-CM

## 2021-02-23 DIAGNOSIS — E78.00 HYPERCHOLESTEREMIA: ICD-10-CM

## 2021-02-23 DIAGNOSIS — Z12.11 SCREEN FOR COLON CANCER: ICD-10-CM

## 2021-02-23 PROCEDURE — 3017F COLORECTAL CA SCREEN DOC REV: CPT | Performed by: FAMILY MEDICINE

## 2021-02-23 PROCEDURE — 99214 OFFICE O/P EST MOD 30 MIN: CPT | Performed by: FAMILY MEDICINE

## 2021-02-23 PROCEDURE — G8427 DOCREV CUR MEDS BY ELIG CLIN: HCPCS | Performed by: FAMILY MEDICINE

## 2021-02-23 PROCEDURE — G0444 DEPRESSION SCREEN ANNUAL: HCPCS | Performed by: FAMILY MEDICINE

## 2021-02-23 PROCEDURE — G0439 PPPS, SUBSEQ VISIT: HCPCS | Performed by: FAMILY MEDICINE

## 2021-02-23 PROCEDURE — G8510 SCR DEP NEG, NO PLAN REQD: HCPCS | Performed by: FAMILY MEDICINE

## 2021-02-23 PROCEDURE — G8420 CALC BMI NORM PARAMETERS: HCPCS | Performed by: FAMILY MEDICINE

## 2021-02-23 PROCEDURE — 99497 ADVNCD CARE PLAN 30 MIN: CPT | Performed by: FAMILY MEDICINE

## 2021-02-23 RX ORDER — ATORVASTATIN CALCIUM 80 MG/1
TABLET, FILM COATED ORAL
Qty: 90 TAB | Refills: 1 | Status: SHIPPED | OUTPATIENT
Start: 2021-02-23

## 2021-02-23 RX ORDER — CARVEDILOL 3.12 MG/1
3.12 TABLET ORAL 2 TIMES DAILY WITH MEALS
Qty: 180 TAB | Refills: 1 | Status: SHIPPED | OUTPATIENT
Start: 2021-02-23

## 2021-02-23 RX ORDER — LEVOTHYROXINE SODIUM 25 UG/1
25 TABLET ORAL
Qty: 30 TAB | Refills: 4 | Status: SHIPPED
Start: 2021-02-23 | End: 2021-05-18 | Stop reason: SINTOL

## 2021-02-23 NOTE — PROGRESS NOTES
[facility]  Elke Cross comes in for f/u care. CAD: Patient has a history of coronary artery disease, STEMI with coronary stent having been placed. He is followed up by the cardiologist.  He has ischemic cardiomyopathy. He was last seen in October by his cardiologist and has a follow-up appointment. Per the record he had a previous echocardiogram that had an ejection fraction of 25%. This was noted when he had stent MI. Subsequently he has had chest ordered. This includes echocardiogram and nuclear stress test.  He is yet to do these. He denies chest pain, shortness of breath or diaphoresis. Has been prescribed aspirin, Coreg and Lipitor. He has not taken these medications in about a month. I advised him on the need to be compliant with treatment plan. I will send a refill of the carvedilol and Lipitor. He needs to follow-up with a cardiologist.  I will place a referral for this. Dyslipidemia: Patient has dyslipidemia. He is on atorvastatin. Has not been taking medication. Prescription is sent into the pharmacy. We discussed the importance of being compliant with treatment plan. Hypothyroidism: Patient has hypothyroidism. We started him on levothyroxine but he did not take the medication. I will send in a prescription for levothyroxine 25 mcg daily. He will start this medication. We will recheck labs in 6 to 8 weeks. Tobacco abuse disorder: Patient continues to smoke. He has cut back on smoking. Currently smokes less than 1 pack a day. I strongly advised him to quit smoking. Patient understands this. He is just not ready yet. Injury thumb: Patient had a heavy object fall on his right thumb. Now he has trouble flexing or extending the thumb. This happened more than a week ago. He has been doing supportive care but the thumb remains swollen and painful. He is taking over-the-counter medication with slight relief. I will order an x-ray of the thumb.       Past Medical History  Past Medical History:   Diagnosis Date    Hypercholesterolemia     STEMI (ST elevation myocardial infarction) ([facility] [address] 2018       Surgical History  Past Surgical History:   Procedure Laterality Date    NM CARDIAC SURG PROCEDURE UNLIST      MI        Medications  Current Outpatient Medications   Medication Sig Dispense Refill    atorvastatin (LIPITOR) 80 mg tablet TAKE 1 TABLET BY MOUTH EVERY DAY IN THE EVENING  Indications: high cholesterol 90 Tab 1    carvediloL (COREG) 3.125 mg tablet Take 1 Tab by mouth two (2) times daily (with meals). 180 Tab 1    levothyroxine (SYNTHROID) 25 mcg tablet Take 1 Tab by mouth Daily (before breakfast). 30 Tab 4    aspirin 81 mg chewable tablet Take 1 Tab by mouth daily. 30 Tab 0       Allergies  No Known Allergies    Family History  Family History   Problem Relation Age of Onset    Heart Disease Father     Heart Attack Father 39    Heart Surgery Brother 79    Heart Surgery Sister 46    Stroke Neg Hx        Social History  Social History     Socioeconomic History    Marital status: SINGLE     Spouse name: Not on file    Number of children: Not on file    Years of education: Not on file    Highest education level: Not on file   Occupational History    Not on file   Social Needs    Financial resource strain: Not on file    Food insecurity     Worry: Not on file     Inability: Not on file    Transportation needs     Medical: Not on file     Non-medical: Not on file   Tobacco Use    Smoking status: Current Every Day Smoker     Packs/day: 0.05     Last attempt to quit: 2018     Years since quittin.8    Smokeless tobacco: Never Used    Tobacco comment: 2 cigarettes a day;  1 cig/day   Substance and Sexual Activity    Alcohol use:  Yes     Alcohol/week: 1.0 standard drinks     Types: 1 Cans of beer per week     Frequency: 2-3 times a week     Comment: 1 beer every 2-3 days    Drug use: Yes     Types: Marijuana    Sexual activity: Not Currently     Partners: Female   Lifestyle    Physical activity     Days per week: Not on file     Minutes per session: Not on file    Stress: Not on file   Relationships    Social connections     Talks on phone: Not on file     Gets together: Not on file     Attends Scientologist service: Not on file     Active member of club or organization: Not on file     Attends meetings of clubs or organizations: Not on file     Relationship status: Not on file    Intimate partner violence     Fear of current or ex partner: Not on file     Emotionally abused: Not on file     Physically abused: Not on file     Forced sexual activity: Not on file   Other Topics Concern    Not on file   Social History Narrative    Not on file       Review of Systems  Review of Systems - Review of all systems is negative except as noted above in the HPI. Vital Signs  Visit Vitals  /64 (BP 1 Location: Left upper arm, BP Patient Position: Sitting, BP Cuff Size: Adult)   Pulse 64   Temp 98 °F (36.7 °C) (Temporal)   Resp 18   Ht 5' 2\" (1.575 m)   Wt 121 lb (54.9 kg)   SpO2 97%   BMI 22.13 kg/m²         Physical Exam  Physical Examination: General appearance - oriented to person, place, and time and acyanotic, in no respiratory distress  Mental status - alert, oriented to person, place, and time, affect appropriate to mood  Neck - supple, no significant adenopathy  Lymphatics - no palpable lymphadenopathy  Chest - clear to auscultation, no wheezes, rales or rhonchi, symmetric air entry  Heart - systolic murmur 2/6 at lower left sternal border  Abdomen - no rebound tenderness noted  Back exam - limited range of motion  Neurological - normal muscle tone, no tremors, strength 5/5  Musculoskeletal -right side with slight swelling and tenderness all along the thumb. No crepitus. Limitation in flexion or extension at the interphalangeal joint and metacarpophalangeal joint.   Extremities - no pedal edema noted      Results  Results for orders placed or performed during the hospital encounter of 10/28/20   CREATININE, POC   Result Value Ref Range    Creatinine, POC 0.8 0.6 - 1.3 MG/DL    GFRAA, POC >60 >60 ml/min/1.73m2    GFRNA, POC >60 >60 ml/min/1.73m2       ASSESSMENT and PLAN    ICD-10-CM ICD-9-CM    1. Coronary artery disease involving native coronary artery of native heart with angina pectoris ([facility] [address]  I25.119 414.01 atorvastatin (LIPITOR) 80 mg tablet     413.9 carvediloL (COREG) 3.125 mg tablet      REFERRAL TO CARDIOLOGY   2. Hypercholesteremia  E78.00 272.0 atorvastatin (LIPITOR) 80 mg tablet   3. Ischemic cardiomyopathy  I25.5 414.8 carvediloL (COREG) 3.125 mg tablet      REFERRAL TO CARDIOLOGY   4. Hypothyroidism, unspecified type  E03.9 244.9 levothyroxine (SYNTHROID) 25 mcg tablet   5. Injury of right thumb, initial encounter  S69.91XA 959.5 XR THUMB RT MIN 2 V      REFERRAL TO ORTHOPEDICS   6. Screen for colon cancer  Z12.11 V76.51 OCCULT BLOOD IMMUNOASSAY,DIAGNOSTIC   7. Medicare annual wellness visit, subsequent  Z00.00 V70.0    8. Advanced directives, counseling/discussion  Z71.89 V65.49 FULL CODE   9. Screening for depression  Z13.31 V79.0 [address]     lab results and schedule of future lab studies reviewed with patient  reviewed diet, exercise and weight control  cardiovascular risk and specific lipid/LDL goals reviewed  reviewed medications and side effects in detail  use of aspirin to prevent MI and TIA's discussed      I have discussed the diagnosis with the patient and the intended plan of care as seen in the above orders. The patient has received an after-visit summary and questions were answered concerning future plans. I have discussed medication, side effects, and warnings with the patient in detail. The patient verbalized understanding and is in agreement with the plan of care. The patient will contact the office with any additional concerns.     Minesh Nichole MD    PLEASE NOTE:   This document has been produced using voice recognition software.  Unrecognized errors in transcription may be present

## 2021-02-23 NOTE — PATIENT INSTRUCTIONS
Medicare Wellness Visit, Male The best way to live healthy is to have a lifestyle where you eat a well-balanced diet, exercise regularly, limit alcohol use, and quit all forms of tobacco/nicotine, if applicable. Regular preventive services are another way to keep healthy. Preventive services (vaccines, screening tests, monitoring & exams) can help personalize your care plan, which helps you manage your own care. Screening tests can find health problems at the earliest stages, when they are easiest to treat. [facility] follows the current, evidence-based guidelines published by the [facility] ([facility]) when recommending preventive services for our patients. Because we follow these guidelines, sometimes recommendations change over time as research supports it. (For example, a prostate screening blood test is no longer routinely recommended for men with no symptoms). Of course, you and your doctor may decide to screen more often for some diseases, based on your risk and co-morbidities (chronic disease you are already diagnosed with). Preventive services for you include: - Medicare offers their members a free annual wellness visit, which is time for you and your primary care provider to discuss and plan for your preventive service needs. Take advantage of this benefit every year! 
-All adults over age 72 should receive the recommended pneumonia vaccines. Current USPSTF guidelines recommend a series of two vaccines for the best pneumonia protection.  
-All adults should have a flu vaccine yearly and tetanus vaccine every 10 years. 
-All adults age 48 and older should receive the shingles vaccines (series of two vaccines). -All adults age 38-68 who are overweight should have a diabetes screening test once every three years. -Other screening tests & preventive services for persons with diabetes include: an eye exam to screen for diabetic retinopathy, a kidney function test, a foot exam, and stricter control over your cholesterol.  
-Cardiovascular screening for adults with routine risk involves an electrocardiogram (ECG) at intervals determined by the provider.  
-Colorectal cancer screening should be done for adults age 54-65 with no increased risk factors for colorectal cancer. There are a number of acceptable methods of screening for this type of cancer. Each test has its own benefits and drawbacks. Discuss with your provider what is most appropriate for you during your annual wellness visit. The different tests include: colonoscopy (considered the best screening method), a fecal occult blood test, a fecal DNA test, and sigmoidoscopy. 
-All adults born between [facility] should be screened once for Hepatitis C. 
-An Abdominal Aortic Aneurysm (AAA) Screening is recommended for men age 73-68 who has ever smoked in their lifetime. Here is a list of your current Health Maintenance items (your personalized list of preventive services) with a due date: 
Health Maintenance Due Topic Date Due  
 Hepatitis C Test  1960  COVID-19 Vaccine (1 of 2) 05/19/1974  
 DTaP/Tdap/Td  (1 - Tdap) 05/19/1979  Shingles Vaccine (1 of 2) 05/19/2008  Colorectal Screening  05/19/2008 Agnieszka Kaminski Annual Well Visit  09/30/2020 Medicare Wellness Visit, Male The best way to live healthy is to have a lifestyle where you eat a well-balanced diet, exercise regularly, limit alcohol use, and quit all forms of tobacco/nicotine, if applicable. Regular preventive services are another way to keep healthy. Preventive services (vaccines, screening tests, monitoring & exams) can help personalize your care plan, which helps you manage your own care. Screening tests can find health problems at the earliest stages, when they are easiest to treat. [facility] follows the current, evidence-based guidelines published by the [facility] ([facility]) when recommending preventive services for our patients. Because we follow these guidelines, sometimes recommendations change over time as research supports it. (For example, a prostate screening blood test is no longer routinely recommended for men with no symptoms). Of course, you and your doctor may decide to screen more often for some diseases, based on your risk and co-morbidities (chronic disease you are already diagnosed with). Preventive services for you include: - Medicare offers their members a free annual wellness visit, which is time for you and your primary care provider to discuss and plan for your preventive service needs. Take advantage of this benefit every year! 
-All adults over age 72 should receive the recommended pneumonia vaccines. Current USPSTF guidelines recommend a series of two vaccines for the best pneumonia protection.  
-All adults should have a flu vaccine yearly and tetanus vaccine every 10 years. 
-All adults age 48 and older should receive the shingles vaccines (series of two vaccines). -All adults age 38-68 who are overweight should have a diabetes screening test once every three years.  
-Other screening tests & preventive services for persons with diabetes include: an eye exam to screen for diabetic retinopathy, a kidney function test, a foot exam, and stricter control over your cholesterol.  
-Cardiovascular screening for adults with routine risk involves an electrocardiogram (ECG) at intervals determined by the provider. -Colorectal cancer screening should be done for adults age 54-65 with no increased risk factors for colorectal cancer. There are a number of acceptable methods of screening for this type of cancer. Each test has its own benefits and drawbacks. Discuss with your provider what is most appropriate for you during your annual wellness visit. The different tests include: colonoscopy (considered the best screening method), a fecal occult blood test, a fecal DNA test, and sigmoidoscopy. 
-All adults born between [facility] should be screened once for Hepatitis C. 
-An Abdominal Aortic Aneurysm (AAA) Screening is recommended for men age 73-68 who has ever smoked in their lifetime. Here is a list of your current Health Maintenance items (your personalized list of preventive services) with a due date: 
Health Maintenance Due Topic Date Due  
 Hepatitis C Test  1960  COVID-19 Vaccine (1 of 2) 05/19/1974  
 DTaP/Tdap/Td  (1 - Tdap) 05/19/1979  Shingles Vaccine (1 of 2) 05/19/2008  Colorectal Screening  05/19/2008

## 2021-02-23 NOTE — ACP (ADVANCE CARE PLANNING)
Advance Care Planning     Advance Care Planning (ACP) Physician/NP/PA Conversation      Date of Conversation: 2/23/2021  Conducted with: Patient with Decision Making Capacity    Healthcare Decision Maker:     Primary Decision Maker (Active): Jacqueline White - Other Relative - [phone]  Click here to complete [place] including selection of the Healthcare Decision Maker Relationship (ie \"Primary\")  Today we documented Decision Maker(s). The patient will provide ACP documents. Care Preferences:    Hospitalization: \"If your health worsens and it becomes clear that your chance of recovery is unlikely, what would be your preference regarding hospitalization? \"  The patient would prefer hospitalization. Ventilation: \"If you were unable to breathe on your own and your chance of recovery was unlikely, what would be your preference about the use of a ventilator (breathing machine) if it was available to you? \"   The patient would desire the use of a ventilator. Resuscitation: \"In the event your heart stopped as a result of an underlying serious health condition, would you want attempts to be made to restart your heart, or would you prefer a natural death? \"   Yes, attempt to resuscitate.     Additional topics discussed: treatment goals, ventilation preferences, hospitalization preferences and resuscitation preferences    Conversation Outcomes / Follow-Up Plan:   ACP in process - completing/providing documents   Reviewed DNR/DNI and patient elects Full Code (Attempt Resuscitation)     Length of Voluntary ACP Conversation in minutes:  16 minutes    Minesh Corona MD

## 2021-02-23 NOTE — PROGRESS NOTES
Chief Complaint   Patient presents with    Annual Wellness Visit    Medication Refill     patient has not taken medications      1. Have you been to the ER, urgent care clinic since your last visit? Hospitalized since your last visit? No    2. Have you seen or consulted any other health care providers outside of the [address] [place] [address] since your last visit? Include any pap smears or colon screening. No  This is the Subsequent Medicare Annual Wellness Exam, performed 12 months or more after the Initial AWV or the last Subsequent AWV    I have reviewed the patient's medical history in detail and updated the computerized patient record. Depression Risk Factor Screening:     3 most recent PHQ Screens 2/23/2021   Little interest or pleasure in doing things Not at all   Feeling down, depressed, irritable, or hopeless Not at all   Total Score PHQ 2 0   Trouble falling or staying asleep, or sleeping too much Not at all   Feeling tired or having little energy Not at all   Poor appetite, weight loss, or overeating Not at all   Feeling bad about yourself - or that you are a failure or have let yourself or your family down Not at all   Moving or speaking so slowly that other people could have noticed; or the opposite being so fidgety that others notice Not at all   Thoughts of being better off dead, or hurting yourself in some way Not at all   How difficult have these problems made it for you to do your work, take care of your home and get along with others Not difficult at all   8 minutes taken on depression screening. Alcohol Risk Screen    Do you average more than 2 drinks per night or 14 drinks a week: No    On any one occasion in the past three months have you have had more than 4 drinks containing alcohol:  No        Functional Ability and Level of Safety:     1. Was the patient's timed Up and GO test unsteady or longer than 30 seconds? No  2.  Does the patient need help with the phone, transportation, shopping, preparing meals, housework, laundry, medications or managing money? Yes  3. Does the patients' home have rugs in the hallway, lack grab bars in the bathroom, lack handrails on the stairs or have poor lighting? No  4. Have you noticed any hearing difficulties? Yes  Hearing Evaluation:    Hearing: The patient needs further evaluation. Activities of Daily Living: The home contains: no safety equipment. Patient does total self care      Ambulation: with no difficulty     Fall Risk:  Fall Risk Assessment, last 12 mths 2/23/2021   Able to walk? Yes   Fall in past 12 months? 0   Do you feel unsteady? 0   Are you worried about falling 0      Abuse Screen:  Patient is not abused       Cognitive Screening    Has your family/caregiver stated any concerns about your memory: no     Cognitive Screening: Normal - MMSE (Mini Mental Status Exam)    Assessment/Plan   Education and counseling provided:  Are appropriate based on today's review and evaluation    1. Medicare annual wellness visit, subsequent    2. Advanced directives, counseling/discussion  - FULL CODE    3. Screening for depression  - [address]    4.  Screen for colon cancer  - OCCULT BLOOD IMMUNOASSAY,DIAGNOSTIC; Future    Health Maintenance Due     Health Maintenance Due   Topic Date Due    Hepatitis C Screening  1960    COVID-19 Vaccine (1 of 2) 05/19/1974    DTaP/Tdap/Td series (1 - Tdap) 05/19/1979    Shingrix Vaccine Age 50> (1 of 2) 05/19/2008    Colorectal Cancer Screening Combo  05/19/2008    Medicare Yearly Exam  09/30/2020       Patient Care Team   Patient Care Team:  Prashanth Macias MD as PCP - General (Family Medicine)  Prahsanth Macias MD as PCP - REHABILITATION [facility] Empaneled Provider    History   Regina Palacios is seen for Medicare wellness exam.    Patient Active Problem List   Diagnosis Code    STEMI (ST elevation myocardial infarction) ([address] I21.3    Cardiomyopathy ([address] I42.9    Hypotension I95.9    Former smoker Z87.891    Elevated LFTs R79.89    History of coronary artery stent placement Z95.5    Acute on chronic systolic congestive heart failure (HCC) I50.23    Coronary artery disease involving native coronary artery of native heart with angina pectoris (HCC) I25.119    History of alcohol abuse F10.11    Tobacco abuse counseling Z71.6    Hypercholesteremia E78.00     Past Medical History:   Diagnosis Date    Hypercholesterolemia     STEMI (ST elevation [facility] infarction) [facility] 2018      Past Surgical History:   Procedure Laterality Date    [place] CARDIAC SURG PROCEDURE UNLIST      MI     Current Outpatient Medications   Medication Sig Dispense Refill    levothyroxine (SYNTHROID) 25 mcg tablet Take 1 Tab by mouth Daily (before breakfast). 30 Tab 2    atorvastatin (LIPITOR) 80 mg tablet TAKE 1 TABLET BY MOUTH EVERY DAY IN THE EVENING  Indications: high cholesterol 90 Tab 1    carvediloL (COREG) 3.125 mg tablet Take 1 Tab by mouth two (2) times daily (with meals). 180 Tab 1    aspirin 81 mg chewable tablet Take 1 Tab by mouth daily. 30 Tab 0     No Known Allergies    Family History   Problem Relation Age of Onset    Heart Disease Father     Heart Attack Father 39    Heart Surgery Brother 79    Heart Surgery Sister 46    Stroke Neg Hx      Social History     Tobacco Use    Smoking status: Current Every Day Smoker     Packs/day: 0.05     Last attempt to quit: 2018     Years since quittin.8    Smokeless tobacco: Never Used    Tobacco comment: 2 cigarettes a day;  1 cig/day   Substance Use Topics    Alcohol use: Yes     Alcohol/week: 1.0 standard drinks     Types: 1 Cans of beer per week     Frequency: 2-3 times a week     Comment: 1 beer every 2-3 days   I have discussed the diagnosis with the patient and the intended plan of care as seen in the above orders.  The patient has received an after-visit summary and questions were answered concerning future plans. I have discussed medication, side effects, and warnings with the patient in detail. The patient verbalized understanding and is in agreement with the plan of care. The patient will contact the office with any additional concerns. Personalized preventative plan of care was discussed, printed and given to patient.     Angelia Campuzano MD

## 2021-03-04 ENCOUNTER — OFFICE VISIT (OUTPATIENT)
Dept: ORTHOPEDIC SURGERY | Age: 61
End: 2021-03-04
Payer: MEDICARE

## 2021-03-04 VITALS
HEART RATE: 71 BPM | WEIGHT: 125 LBS | TEMPERATURE: 97.5 F | DIASTOLIC BLOOD PRESSURE: 72 MMHG | OXYGEN SATURATION: 95 % | SYSTOLIC BLOOD PRESSURE: 119 MMHG | BODY MASS INDEX: 22.15 KG/M2 | HEIGHT: 63 IN | RESPIRATION RATE: 16 BRPM

## 2021-03-04 DIAGNOSIS — S62.511A CLOSED DISPLACED FRACTURE OF PROXIMAL PHALANX OF RIGHT THUMB, INITIAL ENCOUNTER: Primary | ICD-10-CM

## 2021-03-04 PROCEDURE — G8510 SCR DEP NEG, NO PLAN REQD: HCPCS | Performed by: ORTHOPAEDIC SURGERY

## 2021-03-04 PROCEDURE — G8427 DOCREV CUR MEDS BY ELIG CLIN: HCPCS | Performed by: ORTHOPAEDIC SURGERY

## 2021-03-04 PROCEDURE — G8420 CALC BMI NORM PARAMETERS: HCPCS | Performed by: ORTHOPAEDIC SURGERY

## 2021-03-04 PROCEDURE — 99203 OFFICE O/P NEW LOW 30 MIN: CPT | Performed by: ORTHOPAEDIC SURGERY

## 2021-03-04 PROCEDURE — 3017F COLORECTAL CA SCREEN DOC REV: CPT | Performed by: ORTHOPAEDIC SURGERY

## 2021-03-04 PROCEDURE — 73140 X-RAY EXAM OF FINGER(S): CPT | Performed by: ORTHOPAEDIC SURGERY

## 2021-03-04 NOTE — PROGRESS NOTES
Duarte Sousa is a 56 y.o. male right handed . Worker's Compensation and legal considerations: none filed. Vitals:    03/04/21 0932   BP: 119/72   Pulse: 71   Resp: 16   Temp: 97.5 °F (36.4 °C)   TempSrc: Temporal   SpO2: 95%   Weight: 125 lb (56.7 kg)   Height: 5' 2.5\" (1.588 m)   PainSc:   6   PainLoc: Finger           Chief Complaint   Patient presents with    Thumb Pain     Right thumb         HPI: Patient presents today with complaints of an injury to his right thumb. He reports having it slammed against a metal object and having pain initially after with progressively improving. Date of onset: Mid February 2021    Injury: Yes: Comment: Crush    Prior Treatment:  No    Numbness/ Tingling: No      ROS: Review of Systems - General ROS: negative  Psychological ROS: negative  ENT ROS: negative  Allergy and Immunology ROS: negative  Hematological and Lymphatic ROS: negative  Respiratory ROS: no cough, shortness of breath, or wheezing  Cardiovascular ROS: no chest pain or dyspnea on exertion  Gastrointestinal ROS: no abdominal pain, change in bowel habits, or black or bloody stools  Musculoskeletal ROS: positive for - pain in thumb - right  Neurological ROS: negative  Dermatological ROS: negative    Past Medical History:   Diagnosis Date    Hypercholesterolemia     [facility]       Past Surgical History:   Procedure Laterality Date    [facility] CARDIAC SURG PROCEDURE UNLIST      MI       Current Outpatient Medications   Medication Sig Dispense Refill    atorvastatin (LIPITOR) 80 mg tablet TAKE 1 TABLET BY MOUTH EVERY DAY IN THE EVENING  Indications: high cholesterol 90 Tab 1    carvediloL (COREG) 3.125 mg tablet Take 1 Tab by mouth two (2) times daily (with meals). 180 Tab 1    levothyroxine (SYNTHROID) 25 mcg tablet Take 1 Tab by mouth Daily (before breakfast). 30 Tab 4    aspirin 81 mg chewable tablet Take 1 Tab by mouth daily.  30 Tab 0       No Known Allergies        PE:     Physical Exam  Vitals signs and nursing note reviewed. Constitutional:       General: He is not in acute distress. Appearance: Normal appearance. He is not ill-appearing. Eyes:      Extraocular Movements: Extraocular movements intact. Pupils: Pupils are equal, round, and reactive to light. Neck:      Musculoskeletal: Normal range of motion and neck supple. Cardiovascular:      Pulses: Normal pulses. Pulmonary:      Effort: Pulmonary effort is normal. No respiratory distress. Abdominal:      General: Abdomen is flat. There is no distension. Musculoskeletal:         General: Swelling and tenderness present. No deformity or signs of injury. Right lower leg: No edema. Left lower leg: No edema. Skin:     General: Skin is warm and dry. Capillary Refill: Capillary refill takes less than 2 seconds. Findings: No bruising or erythema. Neurological:      General: No focal deficit present. Mental Status: He is alert and oriented to person, place, and time. Cranial Nerves: No cranial nerve deficit. Sensory: No sensory deficit. Psychiatric:         Mood and Affect: Mood normal.         Behavior: Behavior normal.            Right thumb: There is mild edema noted about the thumb. There is no deformity noted. Sensation is intact distally and cap refill is brisk. There is some mild tenderness to palpation about the proximal phalanx. Imaging:     3/4/2021 2 views of right thumb show some interval callus formation at the proximal phalanx fracture similar to previous external plain films.         ICD-10-CM ICD-9-CM    1. Closed displaced fracture of proximal phalanx of right thumb, initial encounter  S62.511A 816.01 AMB POC XRAY, FINGER(S), 2+ VIEWS      AMB SUPPLY ORDER         Plan:     Right thumb spica brace to be worn for the next 4 weeks followed by progressive range of motion program.    Follow-up and Dispositions    · Return in about 4 weeks (around 4/1/2021) for Reevaluation and x-rays.           Plan was reviewed with patient, who verbalized agreement and understanding of the plan

## 2021-03-05 ENCOUNTER — TELEPHONE (OUTPATIENT)
Dept: FAMILY MEDICINE CLINIC | Age: 61
End: 2021-03-05

## 2021-03-05 NOTE — TELEPHONE ENCOUNTER
[facility] called stating patient was there to have labs drawn, but he didn't have any orders. I explained that no orders were in his chart besides: Occult Blood order. They did not want this faxed over because it was expected 2/23/2021.

## 2021-03-14 ENCOUNTER — HOSPITAL ENCOUNTER (OUTPATIENT)
Dept: LAB | Age: 61
Discharge: HOME | End: 2021-03-14
Payer: MEDICARE

## 2021-03-14 DIAGNOSIS — Z12.11 SCREEN FOR COLON CANCER: ICD-10-CM

## 2021-03-14 PROCEDURE — 82274 ASSAY TEST FOR BLOOD FECAL: CPT

## 2021-03-19 LAB — HEMOCCULT STL QL IA: NEGATIVE

## 2021-05-18 ENCOUNTER — OFFICE VISIT (OUTPATIENT)
Dept: FAMILY MEDICINE CLINIC | Age: 61
End: 2021-05-18
Payer: MEDICARE

## 2021-05-18 VITALS
BODY MASS INDEX: 21.9 KG/M2 | WEIGHT: 119 LBS | HEIGHT: 62 IN | RESPIRATION RATE: 18 BRPM | SYSTOLIC BLOOD PRESSURE: 102 MMHG | OXYGEN SATURATION: 99 % | HEART RATE: 64 BPM | TEMPERATURE: 96.9 F | DIASTOLIC BLOOD PRESSURE: 64 MMHG

## 2021-05-18 DIAGNOSIS — E78.00 HYPERCHOLESTEREMIA: ICD-10-CM

## 2021-05-18 DIAGNOSIS — I25.5 ISCHEMIC CARDIOMYOPATHY: ICD-10-CM

## 2021-05-18 DIAGNOSIS — Z11.59 NEED FOR HEPATITIS C SCREENING TEST: ICD-10-CM

## 2021-05-18 DIAGNOSIS — G44.52 NEW DAILY PERSISTENT HEADACHE: ICD-10-CM

## 2021-05-18 DIAGNOSIS — R91.8 OPACITY OF LUNG ON IMAGING STUDY: ICD-10-CM

## 2021-05-18 DIAGNOSIS — R42 DIZZINESS: Primary | ICD-10-CM

## 2021-05-18 DIAGNOSIS — I25.119 CORONARY ARTERY DISEASE INVOLVING NATIVE CORONARY ARTERY OF NATIVE HEART WITH ANGINA PECTORIS (HCC): ICD-10-CM

## 2021-05-18 DIAGNOSIS — R06.02 SOBOE (SHORTNESS OF BREATH ON EXERTION): ICD-10-CM

## 2021-05-18 DIAGNOSIS — R10.84 GENERALIZED ABDOMINAL PAIN: ICD-10-CM

## 2021-05-18 DIAGNOSIS — H54.7 DECREASED VISUAL ACUITY: ICD-10-CM

## 2021-05-18 DIAGNOSIS — R39.9 LOWER URINARY TRACT SYMPTOMS (LUTS): ICD-10-CM

## 2021-05-18 DIAGNOSIS — E03.9 HYPOTHYROIDISM, UNSPECIFIED TYPE: ICD-10-CM

## 2021-05-18 LAB
BILIRUB UR QL STRIP: NEGATIVE
GLUCOSE UR-MCNC: NEGATIVE MG/DL
KETONES P FAST UR STRIP-MCNC: NEGATIVE MG/DL
PH UR STRIP: 7 [PH] (ref 4.6–8)
PROT UR QL STRIP: NEGATIVE
SP GR UR STRIP: 1.01 (ref 1–1.03)
UA UROBILINOGEN AMB POC: NORMAL (ref 0.2–1)
URINALYSIS CLARITY POC: CLEAR
URINALYSIS COLOR POC: YELLOW
URINE BLOOD POC: NEGATIVE
URINE LEUKOCYTES POC: NEGATIVE
URINE NITRITES POC: NEGATIVE

## 2021-05-18 PROCEDURE — 81003 URINALYSIS AUTO W/O SCOPE: CPT | Performed by: FAMILY MEDICINE

## 2021-05-18 PROCEDURE — G8427 DOCREV CUR MEDS BY ELIG CLIN: HCPCS | Performed by: FAMILY MEDICINE

## 2021-05-18 PROCEDURE — G8420 CALC BMI NORM PARAMETERS: HCPCS | Performed by: FAMILY MEDICINE

## 2021-05-18 PROCEDURE — 36415 COLL VENOUS BLD VENIPUNCTURE: CPT | Performed by: FAMILY MEDICINE

## 2021-05-18 PROCEDURE — 3017F COLORECTAL CA SCREEN DOC REV: CPT | Performed by: FAMILY MEDICINE

## 2021-05-18 PROCEDURE — 99215 OFFICE O/P EST HI 40 MIN: CPT | Performed by: FAMILY MEDICINE

## 2021-05-18 PROCEDURE — G8510 SCR DEP NEG, NO PLAN REQD: HCPCS | Performed by: FAMILY MEDICINE

## 2021-05-18 NOTE — PROGRESS NOTES
Venipuncture to patient right forearm at this time. Patient tolerated well. Labs ordered by PCP. No other concerns voiced

## 2021-05-18 NOTE — PROGRESS NOTES
Chief Complaint   Patient presents with    Follow Up Chronic Condition    Headache     dizziness     Abdominal Pain     1. Have you been to the ER, urgent care clinic since your last visit? Hospitalized since your last visit? No    2. Have you seen or consulted any other health care providers outside of the [address] [place] [address] [place] since your last visit? Include any pap smears or colon screening. No     HPI  Tiffany Hoskins comes in for f/u care. Dizziness: patient has dizziness and vertigo. He denies palpitations or syncope. Denies tinnitus or hearing loss. He denies changes in vision. I will check labs today. Headache: patient has severe headaches that come up to 3 x week, taking OTC medication without much relief. Associated blurry vision. No nausea or vomiting. States that the headaches are new and very severe. Given this I will order head CT scan to evaluate for any abnormality. Vision: patient has decreased visual acuity. Needs to have evaluation by the ophthalmologist.  This could be the reason as to why he is having headaches. Referral to ophthalmologist is placed. Abdominal pain: Patient has abdominal pain. This is generalized. States that he has also noted abdominal distention. Has nausea and vomiting especially with meals. Denies fever or chills or night sweats. Denies blood in the stool. I will check labs. I will follow-up with the results. Patient may need to have abdominal CT scan and see the gastroenterologist.  Dysuria: he has LUTS with post micturition dribbling, hesitancy. He has dysuria. Denies hematuria. I will do a urinalysis. I will follow-up with the results. CAD: Patient has a history of coronary artery disease. He has been followed up with a cardiologist.  He denies chest pain, SOB or diaphoresis. He does have a follow-up appointment with a cardiologist next week. He is on aspirin, Coreg and atorvastatin. Dyslipidemia: Patient has dyslipidemia. He is on Lipitor. Tolerating medication. We will recheck labs. Tobacco abuse disorder: Patient continues to smoke. Has been advised to quit the habit. He is not ready yet. We discussed his abnormal lung radiological study and shortness of breath. Lung opacities: Patient has history of lung opacities. This has been noted on a previous chest x-ray and also CT scan of the chest. He was referred to see the pulmonologist but did not go for appointments. I have given him a number to call to set up an appointment to see the pulmonologist. He denies hemoptysis. He however has the shortness of breath. I did emphasize the need to be followed up especially given we need to ensure that this is not cancer. We will do a recheck chest x-ray given the shortness of breath just to make sure that the opacities have not worsened. Past Medical History  Past Medical History:   Diagnosis Date    Hypercholesterolemia     STEMI (ST elevation myocardial infarction) ([facility] [address] 05/07/2018       Surgical History  Past Surgical History:   Procedure Laterality Date    [place] CARDIAC SURG PROCEDURE UNLIST      MI        Medications  Current Outpatient Medications   Medication Sig Dispense Refill    atorvastatin (LIPITOR) 80 mg tablet TAKE 1 TABLET BY MOUTH EVERY DAY IN THE EVENING  Indications: high cholesterol 90 Tab 1    carvediloL (COREG) 3.125 mg tablet Take 1 Tab by mouth two (2) times daily (with meals). 180 Tab 1    aspirin 81 mg chewable tablet Take 1 Tab by mouth daily.  30 Tab 0       Allergies  No Known Allergies    Family History  Family History   Problem Relation Age of Onset    Heart Disease Father     Heart Attack Father 39    Heart Surgery Brother 79    Heart Surgery Sister 46    Stroke Neg Hx        Social History  Social History     Socioeconomic History    Marital status: SINGLE     Spouse name: Not on file    Number of children: Not on file    Years of education: Not on file    Highest education level: Not on file   Occupational History    Not on file   Social Needs    Financial resource strain: Not on file    Food insecurity     Worry: Not on file     Inability: Not on file    Transportation needs     Medical: Not on file     Non-medical: Not on file   Tobacco Use    Smoking status: Current Every Day Smoker     Packs/day: 0.05     Last attempt to quit: 5/7/2018     Years since quitting: 3.0    Smokeless tobacco: Never Used    Tobacco comment: 2 cigarettes a day; 6/20 1 cig/day   Substance and Sexual Activity    Alcohol use: Yes     Alcohol/week: 1.0 standard drinks     Types: 1 Cans of beer per week     Frequency: 2-3 times a week     Comment: 1 beer every 2-3 days    Drug use: Yes     Types: Marijuana    Sexual activity: Not Currently     Partners: Female   Lifestyle    Physical activity     Days per week: Not on file     Minutes per session: Not on file    Stress: Not on file   Relationships    Social connections     Talks on phone: Not on file     Gets together: Not on file     Attends Rastafarian service: Not on file     Active member of club or organization: Not on file     Attends meetings of clubs or organizations: Not on file     Relationship status: Not on file    Intimate partner violence     Fear of current or ex partner: Not on file     Emotionally abused: Not on file     Physically abused: Not on file     Forced sexual activity: Not on file   Other Topics Concern    Not on file   Social History Narrative    Not on file       Review of Systems  Review of Systems - Review of all systems is negative except as noted above in the HPI.     Vital Signs  Visit Vitals  /64 (BP 1 Location: Left upper arm, BP Patient Position: Sitting, BP Cuff Size: Adult)   Pulse 64   Temp 96.9 °F (36.1 °C) (Oral)   Resp 18   Ht 5' 2\" (1.575 m)   Wt 119 lb (54 kg)   SpO2 99%   BMI 21.77 kg/m²     Physical Exam  Physical Examination: General appearance - alert, well appearing, and in no distress, oriented to person, place, and time and acyanotic, in no respiratory distress  Mental status - alert, oriented to person, place, and time, affect appropriate to mood  Eyes - sclera anicteric  Neck - supple, no significant adenopathy  Lymphatics - no palpable lymphadenopathy, no hepatosplenomegaly  Chest - no tachypnea, retractions or cyanosis  Heart - S1 and S2 normal  Abdomen - no rebound tenderness noted  Back exam - limited range of motion  Neurological - normal muscle tone, no tremors, strength 5/5  Musculoskeletal - osteoarthritic changes noted in both hands  Extremities - intact peripheral pulses    Results  Results for orders placed or performed during the hospital encounter of 03/14/21   OCCULT BLOOD IMMUNOASSAY,DIAGNOSTIC   Result Value Ref Range    Occult blood fecal, by IA Negative Negative         ASSESSMENT and PLAN    ICD-10-CM ICD-9-CM    1. Dizziness  R42 780.4    2. Coronary artery disease involving native coronary artery of native heart with angina pectoris ([facility] [address]  I25.119 414.01 LIPID PANEL     305.3 METABOLIC PANEL, COMPREHENSIVE      CBC WITH AUTOMATED DIFF      COLLECTION VENOUS BLOOD,VENIPUNCTURE   3. Hypercholesteremia  E78.00 272.0 COLLECTION VENOUS BLOOD,VENIPUNCTURE   4. Ischemic cardiomyopathy  I25.5 414.8    5. Hypothyroidism, unspecified type  E03.9 244.9 TSH 3RD GENERATION      COLLECTION VENOUS BLOOD,VENIPUNCTURE   6. SOBOE (shortness of breath on exertion)  R06.02 786.05    7. Need for hepatitis C screening test  Z11.59 V73.89 HEPATITIS C AB      COLLECTION VENOUS BLOOD,VENIPUNCTURE   8. Decreased visual acuity  H54.7 369.9 REFERRAL TO OPHTHALMOLOGY   9. Generalized abdominal pain  R10.84 789.07    10. Lower urinary tract symptoms (LUTS)  R39.9 788.99 AMB POC URINALYSIS DIP STICK AUTO W/O MICRO   11.  New daily persistent headache  G44.52 339.42 CT HEAD WO CONT   12. Opacity of lung on imaging study  R91.8 793.19 XR CHEST PA LAT     lab results and schedule of future lab studies reviewed with patient  reviewed diet, exercise and weight control  cardiovascular risk and specific lipid/LDL goals reviewed  reviewed medications and side effects in detail  radiology results and schedule of future radiology studies reviewed with patient      I have discussed the diagnosis with the patient and the intended plan of care as seen in the above orders. The patient has received an after-visit summary and questions were answered concerning future plans. I have discussed medication, side effects, and warnings with the patient in detail. The patient verbalized understanding and is in agreement with the plan of care. The patient will contact the office with any additional concerns. I spent at least 43 minutes on this visit with this established patient. Dewayne Barajas MD    PLEASE NOTE:   This document has been produced using voice recognition software.  Unrecognized errors in transcription may be present

## 2021-05-19 LAB
ALBUMIN SERPL-MCNC: 4.4 G/DL (ref 3.8–4.8)
ALBUMIN/GLOB SERPL: 2.1 {RATIO} (ref 1.2–2.2)
ALP SERPL-CCNC: 111 IU/L (ref 48–121)
ALT SERPL-CCNC: 23 IU/L (ref 0–44)
AST SERPL-CCNC: 24 IU/L (ref 0–40)
BASOPHILS # BLD AUTO: 0.1 X10E3/UL (ref 0–0.2)
BASOPHILS NFR BLD AUTO: 1 %
BILIRUB SERPL-MCNC: 0.5 MG/DL (ref 0–1.2)
BUN SERPL-MCNC: 15 MG/DL (ref 8–27)
BUN/CREAT SERPL: 16 (ref 10–24)
CALCIUM SERPL-MCNC: 9.2 MG/DL (ref 8.6–10.2)
CHLORIDE SERPL-SCNC: 101 MMOL/L (ref 96–106)
CHOLEST SERPL-MCNC: 160 MG/DL (ref 100–199)
CO2 SERPL-SCNC: 25 MMOL/L (ref 20–29)
CREAT SERPL-MCNC: 0.93 MG/DL (ref 0.76–1.27)
CREATININE, EXTERNAL: 0.93
EOSINOPHIL # BLD AUTO: 0.1 X10E3/UL (ref 0–0.4)
EOSINOPHIL NFR BLD AUTO: 1 %
ERYTHROCYTE [DISTWIDTH] IN BLOOD BY AUTOMATED COUNT: 11.8 % (ref 11.6–15.4)
GLOBULIN SER CALC-MCNC: 2.1 G/DL (ref 1.5–4.5)
GLUCOSE SERPL-MCNC: 74 MG/DL (ref 65–99)
HCT VFR BLD AUTO: 41.5 % (ref 37.5–51)
HCV AB S/CO SERPL IA: <0.1 S/CO RATIO (ref 0–0.9)
HDLC SERPL-MCNC: 55 MG/DL
HGB BLD-MCNC: 14.2 G/DL (ref 13–17.7)
IMM GRANULOCYTES # BLD AUTO: 0 X10E3/UL (ref 0–0.1)
IMM GRANULOCYTES NFR BLD AUTO: 0 %
IMP & REVIEW OF LAB RESULTS: NORMAL
LDL-C, EXTERNAL: 92
LDLC SERPL CALC-MCNC: 92 MG/DL (ref 0–99)
LYMPHOCYTES # BLD AUTO: 1.8 X10E3/UL (ref 0.7–3.1)
LYMPHOCYTES NFR BLD AUTO: 20 %
MCH RBC QN AUTO: 31.5 PG (ref 26.6–33)
MCHC RBC AUTO-ENTMCNC: 34.2 G/DL (ref 31.5–35.7)
MCV RBC AUTO: 92 FL (ref 79–97)
MONOCYTES # BLD AUTO: 1 X10E3/UL (ref 0.1–0.9)
MONOCYTES NFR BLD AUTO: 11 %
NEUTROPHILS # BLD AUTO: 6.1 X10E3/UL (ref 1.4–7)
NEUTROPHILS NFR BLD AUTO: 67 %
PLATELET # BLD AUTO: 219 X10E3/UL (ref 150–450)
POTASSIUM SERPL-SCNC: 4.5 MMOL/L (ref 3.5–5.2)
PROT SERPL-MCNC: 6.5 G/DL (ref 6–8.5)
RBC # BLD AUTO: 4.51 X10E6/UL (ref 4.14–5.8)
SODIUM SERPL-SCNC: 138 MMOL/L (ref 134–144)
TRIGL SERPL-MCNC: 67 MG/DL (ref 0–149)
TSH SERPL DL<=0.005 MIU/L-ACNC: 3.68 UIU/ML (ref 0.45–4.5)
VLDLC SERPL CALC-MCNC: 13 MG/DL (ref 5–40)
WBC # BLD AUTO: 9.1 X10E3/UL (ref 3.4–10.8)

## 2021-05-27 ENCOUNTER — OFFICE VISIT (OUTPATIENT)
Dept: CARDIOLOGY CLINIC | Age: 61
End: 2021-05-27
Payer: MEDICARE

## 2021-05-27 VITALS
HEIGHT: 62 IN | HEART RATE: 77 BPM | SYSTOLIC BLOOD PRESSURE: 108 MMHG | DIASTOLIC BLOOD PRESSURE: 71 MMHG | WEIGHT: 119 LBS | BODY MASS INDEX: 21.9 KG/M2

## 2021-05-27 DIAGNOSIS — Z71.6 TOBACCO ABUSE COUNSELING: ICD-10-CM

## 2021-05-27 DIAGNOSIS — I25.119 CORONARY ARTERY DISEASE INVOLVING NATIVE CORONARY ARTERY OF NATIVE HEART WITH ANGINA PECTORIS (HCC): Primary | ICD-10-CM

## 2021-05-27 DIAGNOSIS — I25.5 ISCHEMIC CARDIOMYOPATHY: ICD-10-CM

## 2021-05-27 DIAGNOSIS — F10.11 HISTORY OF ALCOHOL ABUSE: ICD-10-CM

## 2021-05-27 DIAGNOSIS — F12.10 MARIJUANA ABUSE: ICD-10-CM

## 2021-05-27 DIAGNOSIS — E78.00 HYPERCHOLESTEREMIA: ICD-10-CM

## 2021-05-27 DIAGNOSIS — Z95.5 HISTORY OF CORONARY ARTERY STENT PLACEMENT: ICD-10-CM

## 2021-05-27 PROCEDURE — G8510 SCR DEP NEG, NO PLAN REQD: HCPCS | Performed by: INTERNAL MEDICINE

## 2021-05-27 PROCEDURE — 3017F COLORECTAL CA SCREEN DOC REV: CPT | Performed by: INTERNAL MEDICINE

## 2021-05-27 PROCEDURE — 93000 ELECTROCARDIOGRAM COMPLETE: CPT | Performed by: INTERNAL MEDICINE

## 2021-05-27 PROCEDURE — G8420 CALC BMI NORM PARAMETERS: HCPCS | Performed by: INTERNAL MEDICINE

## 2021-05-27 PROCEDURE — 99214 OFFICE O/P EST MOD 30 MIN: CPT | Performed by: INTERNAL MEDICINE

## 2021-05-27 PROCEDURE — G8427 DOCREV CUR MEDS BY ELIG CLIN: HCPCS | Performed by: INTERNAL MEDICINE

## 2021-05-27 NOTE — PROGRESS NOTES
HISTORY OF PRESENT ILLNESS  Júnior Xavier is a 59 y.o. male. 6/20 has swollen rt testicle also    8/2020 - Seen In f/u for CAD. At [facility]OV Stress test and echo were ordered, but he did not schedule due to confusion with insurance. CHF  The history is provided by the patient and medical records. Associated symptoms include chest pain and shortness of breath. Pertinent negatives include no headaches. Cardiomyopathy  The history is provided by the medical records. This is a chronic problem. Associated symptoms include chest pain and shortness of breath. Pertinent negatives include no headaches. Chest Pain (Angina)   The history is provided by the patient. This is a recurrent problem. The current episode started more than 1 week ago (1 wk). The problem has been gradually worsening (2 wks). Duration of episode(s) is 5 minutes. The pain is associated with rest and normal activity (usually lying down). The pain is present in the substernal region. The quality of the pain is described as dull (soreness). The pain does not radiate (left arm numbness). Associated symptoms include shortness of breath. Pertinent negatives include no claudication, no cough, no dizziness, no fever, no headaches, no malaise/fatigue, no nausea, no orthopnea, no palpitations, no PND and no vomiting. He has tried rest for the symptoms. The treatment provided significant relief. Risk factors include smoking/tobacco exposure, male gender and cardiac disease. Shortness of Breath  The history is provided by the patient. This is a new problem. The problem occurs intermittently. The current episode started more than 1 week ago (12/19). The problem has not changed since onset. Associated symptoms include chest pain. Pertinent negatives include no fever, no headaches, no cough, no wheezing, no PND, no orthopnea, no vomiting, no rash, no leg swelling and no claudication.  The problem's precipitants include exercise (50 ft; 10/20 1/8 mile; 5/21 1 gilbert). He has tried nothing for the symptoms. Cholesterol Problem  The history is provided by the medical records. This is a chronic problem. Associated symptoms include chest pain and shortness of breath. Pertinent negatives include no headaches. Review of Systems   Constitutional: Negative for chills, fever, malaise/fatigue and weight loss. HENT: Negative for nosebleeds. Eyes: Negative for discharge. Respiratory: Positive for shortness of breath. Negative for cough and wheezing. Cardiovascular: Positive for chest pain. Negative for palpitations, orthopnea, claudication, leg swelling and PND. Gastrointestinal: Negative for diarrhea, nausea and vomiting. Genitourinary: Negative for dysuria and hematuria. Musculoskeletal: Negative for joint pain. Skin: Negative for rash. Neurological: Negative for dizziness, seizures, loss of consciousness and headaches. Endo/Heme/Allergies: Negative for polydipsia. Does not bruise/bleed easily. Psychiatric/Behavioral: Negative for depression and substance abuse. The patient does not have insomnia. No Known Allergies    Past Medical History:   Diagnosis Date    Hypercholesterolemia     [facility]       Family History   Problem Relation Age of Onset    Heart Disease Father     Heart Attack Father 39    Heart Surgery Brother 79    Heart Surgery Sister 46    Stroke Neg Hx        Social History     Tobacco Use    Smoking status: Current Every Day Smoker     Packs/day: 0.05     Last attempt to quit: 5/7/2018     Years since quitting: 3.0    Smokeless tobacco: Never Used    Tobacco comment: 6 cigarettes a day   Substance Use Topics    Alcohol use:  Yes     Alcohol/week: 1.0 standard drinks     Types: 1 Cans of beer per week     Comment: 1 beer every 2-3 days    Drug use: Yes     Types: Marijuana        Current Outpatient Medications   Medication Sig    atorvastatin (LIPITOR) 80 mg tablet TAKE 1 TABLET BY MOUTH EVERY DAY IN THE EVENING  Indications: high cholesterol    carvediloL (COREG) 3.125 mg tablet Take 1 Tab by mouth two (2) times daily (with meals).  aspirin 81 mg chewable tablet Take 1 Tab by mouth daily. No current facility-administered medications for this visit. Past Surgical History:   Procedure Laterality Date    [place] CARDIAC SURG PROCEDURE UNLIST      MI       Visit Vitals  /71   Pulse 77   Ht 5' 2\" (1.575 m)   Wt 54 kg (119 lb)   BMI 21.77 kg/m²       Diagnostic Studies:  I have reviewed the relevant tests done on the patient and show as follows  EKG tracings reviewed by me today. EKG Results     None        XR Results (most recent):  Results from Appointment encounter on 05/18/21    XR CHEST PA LAT    Narrative  EXAM: CHEST PA AND LATERAL    CLINICAL HISTORY/INDICATION:  SOB, Lung opacities , long-time smoker    COMPARISON: Chest x-ray 10/1/2020. TECHNIQUE: PA and lateral views    FINDINGS:    The cardiac and mediastinal silhouette is normal.  The lungs are clear. The  costophrenic angles are sharply defined. Pulmonary vascularity is normal. Mild  disc space narrowing with marginal spurring involves the mid and lower thoracic  spine. Impression  No acute finding  Interval resolution of the previously demonstrated infiltrates           Mr. Abram Padilla has a reminder for a \"due or due soon\" health maintenance. I have asked that he contact his primary care provider for follow-up on this health maintenance. Physical Exam  Constitutional:       General: He is not in acute distress. Appearance: He is well-developed. HENT:      Head: Normocephalic and atraumatic. Mouth/Throat:      Dentition: Normal dentition. Eyes:      General: No scleral icterus. Right eye: No discharge. Left eye: No discharge. Neck:      Thyroid: No thyromegaly. Vascular: No carotid bruit or JVD.    Cardiovascular:      Rate and Rhythm: Normal rate and regular rhythm. Pulses: Intact distal pulses. Heart sounds: Normal heart sounds, S1 normal and S2 normal. No murmur heard. No friction rub. No gallop. Pulmonary:      Effort: Pulmonary effort is normal.      Breath sounds: Normal breath sounds. No wheezing or rales. Abdominal:      Palpations: Abdomen is soft. There is no mass. Tenderness: There is no abdominal tenderness. Musculoskeletal:      Cervical back: Neck supple. Lymphadenopathy:      Cervical:      Right cervical: No superficial cervical adenopathy. Left cervical: No superficial cervical adenopathy. Skin:     General: Skin is warm and dry. Findings: No rash. Neurological:      Mental Status: He is alert and oriented to person, place, and time. Psychiatric:         Behavior: Behavior normal.         ASSESSMENT and PLAN    Lipid Complete PanelResulted: 6/19/2020 5:26 PM  [address]  Component Name Value Ref Range   Cholesterol 112 110 - 200 mg/dL   Triglyceride 57 40 - 149 mg/dL   HDL 37 (L) >=40 mg/dL   Cholesterol/HDL 3.0 0.0 - 5.0    Non-HDL Cholesterol 75 <130 mg/dL   LDL CALCULATION 64 50 - 99 mg/dL   VLDL CALCULATION 11 8 - 30 mg/dL   LDL/HDL Ratio 1.7      TSH 5.64    10/20 Seen in f/u for CAD. C/O SOB with occasional chest pain and left arm numbness. This primarily occurs with rest and similar to symptoms prior to STEMI. Will evaluate with echo and stress test.  He was started on lasix at [facility]. Edema has resolved. Diagnoses and all orders for this visit:    1. Coronary artery disease involving native coronary artery of native heart with angina pectoris (HCC)  -     AMB POC EKG ROUTINE W/ 12 LEADS, INTER & REP  -     NUCLEAR CARDIAC STRESS TEST; Future    2. History of coronary artery stent placement    3. Tobacco abuse counseling    4. Hypercholesteremia    5. History of alcohol abuse  Comments:  5/21 reduced well since 2018- now 1 beer every 2-3 days    6. Marijuana abuse    7.  Ischemic cardiomyopathy  -     ECHO ADULT COMPLETE; Future        Pertinent laboratory and test data reviewed and discussed with patient. See patient instructions also for other medical advice given    There are no discontinued medications. Follow-up and Dispositions    · Return in about 2 weeks (around 6/10/2021), or if symptoms worsen or fail to improve, for post test.       5/27/2021 recurrent chest pain in a patient with ischemic cardiomyopathy. Check echo and a stress test.  He has cut down his alcohol significantly and reduce smoking. Recommended to stop smoking completely. Patient understands and will try. Smoking marijuana 3 or more times a week. Advised that it can be associated with irregular heartbeat and heart attack. He knows that he needs to stop that also. Check labs for lipids as ordered.

## 2021-05-27 NOTE — PATIENT INSTRUCTIONS
There are no discontinued medications. Learning About Benefits From Quitting Smoking How does quitting smoking make you healthier? If you're thinking about quitting smoking, you may have a few reasons to be smoke-free. Your health may be one of them. · When you quit smoking, you lower your risks for cancer, lung disease, heart attack, stroke, blood vessel disease, and blindness from macular degeneration. · When you're smoke-free, you get sick less often, and you heal faster. You are less likely to get colds, flu, bronchitis, and pneumonia. · As a nonsmoker, you may find that your mood is better and you are less stressed. When and how will you feel healthier? Quitting has real health benefits that start from day 1 of being smoke-free. And the longer you stay smoke-free, the healthier you get and the better you feel. The first hours · After just 20 minutes, your blood pressure and heart rate go down. That means there's less stress on your heart and blood vessels. · Within 12 hours, the level of carbon monoxide in your blood drops back to normal. That makes room for more oxygen. With more oxygen in your body, you may notice that you have more energy than when you smoked. After 2 weeks · Your lungs start to work better. · Your risk of heart attack starts to drop. After 1 month · When your lungs are clear, you cough less and breathe deeper, so it's easier to be active. · Your sense of taste and smell return. That means you can enjoy food more than you have since you started smoking. Over the years · Over the years, your risks of heart disease, heart attack, and stroke are lower. · After 10 years, your risk of dying from lung cancer is cut by about half. And your risk for many other types of cancer is lower too. How would quitting help others in your life? When you quit smoking, you improve the health of everyone who now breathes in your smoke.  
· Their heart, lung, and cancer risks drop, much like yours. · They are sick less. For babies and small children, living smoke-free means they're less likely to have ear infections, pneumonia, and bronchitis. · If you're a woman who is or will be pregnant someday, quitting smoking means a healthier . · Children who are close to you are less likely to become adult smokers. Where can you learn more? Go to http://www.mercado.com/ Enter 052 806 72 11 in the search box to learn more about \"Learning About Benefits From Quitting Smoking. \" Current as of: 2020               Content Version: 12.8 © 9600-2332 [employer], [employer]. Care instructions adapted under license by [employer] (which disclaims liability or warranty for this information). If you have questions about a medical condition or this instruction, always ask your healthcare professional. [address] any warranty or liability for your use of this information.

## 2021-05-27 NOTE — PROGRESS NOTES
1. Have you been to the ER, urgent care clinic since your last visit? Hospitalized since your last visit?     no  2. Have you seen or consulted any other health care providers outside of the [address] [place] [address] since your last visit? Include any pap smears or colon screening. Yes pcp    3. Since your last visit, have you had any of the following symptoms? Some chest pain        4. Have you had any blood work, X-rays or cardiac testing? Yes pcp and chest xray            5.  Where do you normally have your labs drawn?   pcp    6. Do you need any refills today?    no

## 2021-06-11 ENCOUNTER — TELEPHONE (OUTPATIENT)
Dept: FAMILY MEDICINE CLINIC | Age: 61
End: 2021-06-11

## 2021-06-11 NOTE — TELEPHONE ENCOUNTER
Patient called after receiving a letter in the mail reg lab results.   After obtaining identifers patient was advised of all lab results and recommendations

## 2021-08-03 ENCOUNTER — OFFICE VISIT (OUTPATIENT)
Dept: PULMONOLOGY | Age: 61
End: 2021-08-03
Payer: MEDICARE

## 2021-08-03 ENCOUNTER — DOCUMENTATION ONLY (OUTPATIENT)
Dept: PULMONOLOGY | Age: 61
End: 2021-08-03

## 2021-08-03 VITALS
RESPIRATION RATE: 16 BRPM | HEART RATE: 66 BPM | BODY MASS INDEX: 21.83 KG/M2 | OXYGEN SATURATION: 98 % | TEMPERATURE: 97.9 F | SYSTOLIC BLOOD PRESSURE: 105 MMHG | DIASTOLIC BLOOD PRESSURE: 64 MMHG | WEIGHT: 118.6 LBS | HEIGHT: 62 IN

## 2021-08-03 DIAGNOSIS — I25.5 ISCHEMIC CARDIOMYOPATHY: ICD-10-CM

## 2021-08-03 DIAGNOSIS — Z01.812 PRE-PROCEDURE LAB EXAM: ICD-10-CM

## 2021-08-03 DIAGNOSIS — R06.02 SOB (SHORTNESS OF BREATH): ICD-10-CM

## 2021-08-03 DIAGNOSIS — Z87.891 FORMER SMOKER: ICD-10-CM

## 2021-08-03 DIAGNOSIS — Z87.891 PERSONAL HISTORY OF TOBACCO USE, PRESENTING HAZARDS TO HEALTH: ICD-10-CM

## 2021-08-03 DIAGNOSIS — R93.89 ABNORMAL CHEST CT: Primary | ICD-10-CM

## 2021-08-03 DIAGNOSIS — R05.9 COUGH: ICD-10-CM

## 2021-08-03 PROCEDURE — G8420 CALC BMI NORM PARAMETERS: HCPCS | Performed by: INTERNAL MEDICINE

## 2021-08-03 PROCEDURE — G8432 DEP SCR NOT DOC, RNG: HCPCS | Performed by: INTERNAL MEDICINE

## 2021-08-03 PROCEDURE — 94618 PULMONARY STRESS TESTING: CPT | Performed by: INTERNAL MEDICINE

## 2021-08-03 PROCEDURE — 99204 OFFICE O/P NEW MOD 45 MIN: CPT | Performed by: INTERNAL MEDICINE

## 2021-08-03 PROCEDURE — 3017F COLORECTAL CA SCREEN DOC REV: CPT | Performed by: INTERNAL MEDICINE

## 2021-08-03 PROCEDURE — G8427 DOCREV CUR MEDS BY ELIG CLIN: HCPCS | Performed by: INTERNAL MEDICINE

## 2021-08-03 NOTE — PROGRESS NOTES
Pt was originally scheduled for NP apt 12/8/20 and rhonda to 1/26/21 with Dr. Alondra Sauceda. We then were informed Dr. Alondra Sauceda not coming back in Jan, so we tried to reach pt by phone to rhonda w/another dr and were unable to reach him. Letter was then mailed to pt. Apt was made in May for 8/3/2021.

## 2021-08-03 NOTE — LETTER
8/3/2021    Patient: Silas Soriano   YOB: 1960   Date of Visit: 8/3/2021     Krystal Murray MD  [address]  Via In Basket    Dear Krystal Murray MD,      Thank you for referring Mr. Ebonie Abreu to [address] [place] [address] for evaluation. My notes for this consultation are attached. If you have questions, please do not hesitate to call me. I look forward to following your patient along with you.       Sincerely,    Pascual Zhang MD

## 2021-08-03 NOTE — PROGRESS NOTES
[facility]  Pulmonary, Critical Care, and Sleep Medicine      Pulmonary Office Initial Consultation    Name: Igor Villarreal     : 1960     Date: 8/3/2021        Subjective:   Patient has been referred for evaluation of: Abnormal CT scan of chest.    Patient is a 59 y.o. male who is a lifelong smoker-started age 7 and continues to smoke. Total 56 years of smoking amounting to 50 pack year on average. Patient states that currently smoking about 6 cigarettes a day trying to slow down and quit. He had imaging studies done in 2020 which were noted to be abnormal and subsequently referred to pulmonology. Patient states that he had follow-up chest x-ray in May 2021. Currently he complains of cough especially in the morning which he attributes to as being a smoker's cough. He has some shortness of breath with activity but still able to carry out activities of daily living and work. 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 Denies any significant productive expectoration on a daily basis. Has previously had hemoptysis and more recently episode of epistaxis. Denies any wheezing  Denies fever chills night sweats  Denies any significant unexplained weight loss         1 2 3 4 5   Cough   2      Mucus  1       Chest tightness  0       ADL's  1       Climb 1 flight stairs  1       Sleep  0       Energy level  1         Scale 1   2  3  4  5  Never to all the time    Total score CAT < 10          Comorbid conditions include-history of coronary artery disease-s/p STEMI 3 years back, cardiomyopathy and CHF  Smoking status: Current everyday smoker 6 cigarettes a day.   Total 50 pack year  Previous history of alcohol use  Occupational exposure-construction dust  Environmental exposures-construction dust    Review of data:  I have personally reviewed all data-clinical encounters, imaging, outside test results pertinent to patient's care. Testing:  CXR-5/2021  CT scan-10/2020  PFT-not available  Echo-reviewed      Vaccinations: Patient states that he does not take vaccines  Pneumococcal  Influenza  Covid vaccine-not taken but planning to take it soon    Past Medical History:   Diagnosis Date    Hypercholesterolemia     STEMI (ST elevation myocardial infarction) ([facility] [address] 05/07/2018     Past Surgical History:   Procedure Laterality Date    ND CARDIAC SURG PROCEDURE UNLIST      MI     Social History     Socioeconomic History    Marital status: SINGLE     Spouse name: Not on file    Number of children: Not on file    Years of education: Not on file    Highest education level: Not on file   Tobacco Use    Smoking status: Current Every Day Smoker     Packs/day: 0.25     Last attempt to quit: 5/7/2018     Years since quitting: 3.2    Smokeless tobacco: Never Used    Tobacco comment: 6 cigarettes a day   Substance and Sexual Activity    Alcohol use: Yes     Alcohol/week: 1.0 standard drinks     Types: 1 Cans of beer per week     Comment: 1 beer every 2-3 days    Drug use: Yes     Frequency: 4.0 times per week     Types: Marijuana    Sexual activity: Not Currently     Partners: Female     Social Determinants of Health     Financial Resource Strain:     Difficulty of Paying Living Expenses:    Food Insecurity:     Worried About Running Out of Food in the Last Year:     Ran Out of Food in the Last Year:    Transportation Needs:     Lack of Transportation (Medical):      Lack of Transportation (Non-Medical):    Physical Activity:     Days of Exercise per Week:     Minutes of Exercise per Session:    Stress:     Feeling of Stress :    Social Connections:     Frequency of Communication with Friends and Family:     Frequency of Social Gatherings with Friends and Family:     Attends Cheondoism Services:     Active Member of Clubs or Organizations:     Attends Club or Organization Meetings:     Marital Status:      Family History   Problem Relation Age of Onset    Heart Disease Father     Heart Attack Father 39    Heart Surgery Brother 79    Heart Surgery Sister 46    Stroke Neg Hx      No Known Allergies    Current Outpatient Medications   Medication Sig Dispense Refill    atorvastatin (LIPITOR) 80 mg tablet TAKE 1 TABLET BY MOUTH EVERY DAY IN THE EVENING  Indications: high cholesterol 90 Tab 1    carvediloL (COREG) 3.125 mg tablet Take 1 Tab by mouth two (2) times daily (with meals). 180 Tab 1    aspirin 81 mg chewable tablet Take 1 Tab by mouth daily.  30 Tab 0     Review of Systems:  HEENT: Recent episode of epistaxis, no nasal drainage, no difficulty in swallowing, no redness in eyes  Respiratory: as above  Cardiovascular: no chest pain, no palpitations, no chronic leg edema, no syncope  Gastrointestinal: no abd pain, no vomiting, no diarrhea, no bleeding symptoms  Genitourinary: No urinary symptoms or hematuria  Integument/breast: No ulcers or rashes  Musculoskeletal:Neg  Neurological: No focal weakness, no seizures, no headaches  Behvioral/Psych: No anxiety, no depression  Constitutional: No fever, no chills, no weight loss, no night sweats     Objective:     Visit Vitals  /64 (BP 1 Location: Left upper arm, BP Patient Position: Sitting, BP Cuff Size: Large adult)   Pulse 66   Temp 97.9 °F (36.6 °C) (Oral)   Resp 16   Ht 5' 2\" (1.575 m)   Wt 53.8 kg (118 lb 9.6 oz)   SpO2 98% Comment: RA Rest   BMI 21.69 kg/m²        Physical Exam:   General: comfortable, no acute distress, lean built  HEENT: pupils reactive, sclera anicteric, EOM intact  Neck: No adenopathy or thyroid swelling, no lymphadenopathy or JVD, supple  CVS: S1S2 no murmurs  RS: Mod AE bilaterally, no tactile fremitus or egophony, no accessory muscle use  Abd: soft, non tender, no hepatosplenomegaly  Neuro: non focal, awake, alert  Extrm: no leg edema, clubbing or cyanosis  Skin: no rash    Data review: Pertinent labs: CBC, BMP, LFT's    PFT:    Date FVC FEV1  FEV1/FVC [id number] TLC RV RV/TLC VC DLCO                                                     6 min walk test;      Results for orders placed or performed during the hospital encounter of 05/07/18   EKG, 12 LEAD, INITIAL   Result Value Ref Range    Ventricular Rate 98 BPM    Atrial Rate 98 BPM    P-R Interval 144 ms    QRS Duration 90 ms    Q-T Interval 302 ms    QTC Calculation (Bezet) 385 ms    Calculated P Axis 70 degrees    Calculated R Axis 55 degrees    Calculated T Axis 69 degrees    Diagnosis       Normal sinus rhythm  Anteroseptal infarct (cited on or before 07-MAY-2018)  Lateral injury pattern  ACUTE MI  Abnormal ECG  When compared with ECG of 07-MAY-2018 11:04,  Serial changes of evolving Anteroseptal infarct present  Confirmed by Bimal Tarango ([id number]) on 5/7/2018 4:40:04 PM         Imaging:  I have personally reviewed the patients radiographs and have reviewed the reports:  XR Results (most recent):  Results from Appointment encounter on 05/18/21    XR CHEST PA LAT    Narrative  EXAM: CHEST PA AND LATERAL    CLINICAL HISTORY/INDICATION:  SOB, Lung opacities , long-time smoker    COMPARISON: Chest x-ray 10/1/2020. TECHNIQUE: PA and lateral views    FINDINGS:    The cardiac and mediastinal silhouette is normal.  The lungs are clear. The  costophrenic angles are sharply defined. Pulmonary vascularity is normal. Mild  disc space narrowing with marginal spurring involves the mid and lower thoracic  spine. Impression  No acute finding  Interval resolution of the previously demonstrated infiltrates    CT Results (most recent):  Results from Hospital Encounter encounter on 10/28/20    CT CHEST W CONT    Narrative  CT CHEST WITH ENHANCEMENT    INDICATION: Abnormal chest radiograph with opacity, hemoptysis, smoking  history. .    TECHNIQUE: Axial images obtained from the thoracic inlet to the level of the  diaphragm following uneventful administration of 80 mL Isovue-300 nonionic  intravenous contrast. Coronal and sagittal reformatted images were obtained. All CT scans at this facility are performed using dose optimization technique as  appropriate to a performed exam, to include automated exposure control,  adjustment of the mA and/or kV according to patient size (including appropriate  matching first site-specific examinations), or use of iterative reconstruction  technique. COMPARISON: 10/1/2020. CHEST FINDINGS:    Thyroid: Unremarkable in its visualized aspects. Pericardium/ Heart: No pericardial effusion. Dilated left ventricle which  measures 6.3 cm. Left atrium is enlarged. Severe coronary arteriosclerosis and  possible stent in the LAD. Aorta/ Vessels: No aneurysm or dissection. Mild aortic atherosclerosis. Lymph Nodes: 0.9 cm right posterior upper paratracheal lymph node (9). 1.1 cm  precarinal node (25). 1.3 cm subcarinal node (30). .    Lungs: Mild bronchial wall thickening and mild paraseptal and centrilobular  upper lobe predominant emphysematous changes. Mild intralobular septal  thickening at the apices. There is bronchovascular small region of consolidation  in the dependent lingula which extends to the subpleural space. 2.7 x 1.3 cm  subpleural consolidation in the posteromedial left lower lobe (51). There is  mild adjacent groundglass. Mild subpleural densities in the lateral right upper  lobe. Mild reticulations in the periphery of the lingula (29). Pleura: Small bilateral pleural effusions. No pneumothorax. Upper Abdomen: Mild reflux of contrast into the hepatic veins. Left adrenal  thickening. Bones/soft tissues: Mild anasarca. Degenerative disc disease. Impression  IMPRESSION:    1. Consolidations in the lingula and left lower lobe may represent multifocal  pneumonia but recommend follow-up imaging to ensure resolution since malignancy  could appear similarly.   2.  Mild mediastinal lymphadenopathy which may be reactive. 3.  Mild bronchitis and emphysema with mild regions of bronchiolitis. 4.  Small bilateral pleural effusions. 5.  Dilated left ventricle and enlarged left atrium. Reflux of contrast into the  hepatic veins is suggestive of right heart dysfunction. 6.  Severe coronary arteriosclerosis. 7.  Mild anasarca. .     Patient Active Problem List   Diagnosis Code    STEMI (ST elevation myocardial infarction) ([facility] [address] I21.3    Cardiomyopathy ([facility] [address] I42.9    Hypotension I95.9    Former smoker Z87.891    Elevated LFTs R79.89    History of coronary artery stent placement Z95.5    Acute on chronic systolic congestive heart failure (HCC) I50.23    Coronary artery disease involving native coronary artery of native heart with angina pectoris (HCC) I25.119    History of alcohol abuse F10.11    Tobacco abuse counseling Z71.6    Hypercholesteremia E78.00    Abnormal chest CT R93.89     IMPRESSION:   · Abnormal CT scan of the chest-multifocal consolidative infiltrates in right lower lobe and lingula associated with mediastinal adenopathy noted on CT with contrast in October 2020. Patient states he was asymptomatic at that time-question in retrospect if this was Covid. Follow-up chest x-ray shows clearing of the consolidations however would need to compare with a CAT scan to ensure complete clearing since plain imaging would miss some of the infiltrates noted. · Emphysema-noted on CT scan paraseptal centrilobular in upper lobe associated with septal thickening, bronchial wall thickening very suggestive of clinical diagnosis of COPD/emphysema in a patient who has been a lifelong smoker  · Personal history of smoking-50-pack-year. Qualifies for annual LDCT by age, smoking history  · Coronary artery disease  · Lean body mass-BMI 21.69      RECOMMENDATIONS:   · Discussed with patient findings on CT imaging and need for follow-up CT scan.   This will be done as an LDCT which needs to be done for lung cancer screening. Patient meets criteria  · Shared decision making done  · Patient is committed to quitting-counseled  · Will need PFTs, 6-minute walk test  · Strongly recommend patient getting Covid vaccine-I have counseled him and he seems to be agreeable  · Maintain physical activity. Healthy weight  · We will consider referral to pulmonary rehab  · Follow-up after PFTs and LDCT completed  · Will follow for management of chronic complex pulmonary conditions     Health maintenance screens deferred to Primary care provider. Jorge Stratton MD    This patient has a high complexity chronic care condition   This Visit needed Moderate/High complexity medically necessary decision making and management plans. Discussed with patient current guidelines for screening for lung cancer. Current recommendations are to obtain yearly screening LDCT yearly for age 46-80, or until smoke free for 15 years. Patient has 50 pack year history of cigarette smoking and currently committed to quit. Discussed with patient risks and benefits of screening, including over-diagnosis, false positive rate, and total radiation exposure. Patient currently exhibits no signs or symptoms suggestive of lung cancer. Discussed with patient importance of compliance with yearly annual lung cancer screenings and willingness to undergo diagnosis and treatment if screening scan is positive. In addition, patient was counseled regarding (remaining smoke free/total smoking cessation).     Rina Oliva MD

## 2021-08-03 NOTE — PROGRESS NOTES
Jorge Luis Wright presents today for   Chief Complaint   Patient presents with    Results     Chest CT       Is someone accompanying this pt? No    Is the patient using any DME equipment during OV? No    -DME Company NA    Depression Screening:  3 most recent PHQ Screens 5/27/2021   Little interest or pleasure in doing things Not at all   Feeling down, depressed, irritable, or hopeless Not at all   Total Score PHQ 2 0   Trouble falling or staying asleep, or sleeping too much -   Feeling tired or having little energy -   Poor appetite, weight loss, or overeating -   Feeling bad about yourself - or that you are a failure or have let yourself or your family down -   Moving or speaking so slowly that other people could have noticed; or the opposite being so fidgety that others notice -   Thoughts of being better off dead, or hurting yourself in some way -   How difficult have these problems made it for you to do your work, take care of your home and get along with others -       Learning Assessment:  Learning Assessment 5/17/2018   PRIMARY LEARNER Patient   HIGHEST LEVEL OF EDUCATION - PRIMARY LEARNER  DID NOT GRADUATE HIGH SCHOOL   BARRIERS PRIMARY LEARNER NONE   CO-LEARNER CAREGIVER No   PRIMARY LANGUAGE ENGLISH   LEARNER PREFERENCE PRIMARY DEMONSTRATION   ANSWERED BY self   RELATIONSHIP SELF       Abuse Screening:  Abuse Screening Questionnaire 5/17/2018   Do you ever feel afraid of your partner? N   Are you in a relationship with someone who physically or mentally threatens you? N   Is it safe for you to go home? Y       Fall Risk  Fall Risk Assessment, last 12 mths 3/4/2021   Able to walk? Yes   Fall in past 12 months? 0   Do you feel unsteady? 0   Are you worried about falling 0         Coordination of Care:  1. Have you been to the ER, urgent care clinic since your last visit? Hospitalized since your last visit? No    2.  Have you seen or consulted any other health care providers outside of the [facility] since your last visit? Include any pap smears or colon screening. No    Patient declined Flu and Covid vaccines.

## 2021-08-03 NOTE — PATIENT INSTRUCTIONS
Stopping Smoking: Care Instructions  Your Care Instructions     Cigarette smokers crave the nicotine in cigarettes. Giving it up is much harder than simply changing a habit. Your body has to stop craving the nicotine. It is hard to quit, but you can do it. There are many tools that people use to quit smoking. You may find that combining tools works best for you. There are several steps to quitting. First you get ready to quit. Then you get support to help you. After that, you learn new skills and behaviors to become a nonsmoker. For many people, a necessary step is getting and using medicine. Your doctor will help you set up the plan that best meets your needs. You may want to attend a smoking cessation program to help you quit smoking. When you choose a program, look for one that has proven success. Ask your doctor for ideas. You will greatly increase your chances of success if you take medicine as well as get counseling or join a cessation program.  Some of the changes you feel when you first quit tobacco are uncomfortable. Your body will miss the nicotine at first, and you may feel short-tempered and grumpy. You may have trouble sleeping or concentrating. Medicine can help you deal with these symptoms. You may struggle with changing your smoking habits and rituals. The last step is the tricky one: Be prepared for the smoking urge to continue for a time. This is a lot to deal with, but keep at it. You will feel better. Follow-up care is a key part of your treatment and safety. Be sure to make and go to all appointments, and call your doctor if you are having problems. It's also a good idea to know your test results and keep a list of the medicines you take. How can you care for yourself at home? · Ask your family, friends, and coworkers for support. You have a better chance of quitting if you have help and support.   · Join a support group, such as Nicotine Anonymous, for people who are trying to quit smoking. · Consider signing up for a smoking cessation program, such as the American Lung Association's Freedom from Smoking program.  · Get text messaging support. Go to the website at www.smokefree. gov to sign up for the [facility] program.  · Set a quit date. Pick your date carefully so that it is not right in the middle of a big deadline or stressful time. Once you quit, do not even take a puff. Get rid of all ashtrays and lighters after your last cigarette. Clean your house and your clothes so that they do not smell of smoke. · Learn how to be a nonsmoker. Think about ways you can avoid those things that make you reach for a cigarette. ? Avoid situations that put you at greatest risk for smoking. For some people, it is hard to have a drink with friends without smoking. For others, they might skip a coffee break with coworkers who smoke. ? Change your daily routine. Take a different route to work or eat a meal in a different place. · Cut down on stress. Calm yourself or release tension by doing an activity you enjoy, such as reading a book, taking a hot bath, or gardening. · Talk to your doctor or pharmacist about nicotine replacement therapy, which replaces the nicotine in your body. You still get nicotine but you do not use tobacco. Nicotine replacement products help you slowly reduce the amount of nicotine you need. These products come in several forms, many of them available over-the-counter:  ? Nicotine patches  ? Nicotine gum and lozenges  ? Nicotine inhaler  · Ask your doctor about bupropion (Wellbutrin) or varenicline (Chantix), which are prescription medicines. They do not contain nicotine. They help you by reducing withdrawal symptoms, such as stress and anxiety. · Some people find hypnosis, acupuncture, and massage helpful for ending the smoking habit. · Eat a healthy diet and get regular exercise. Having healthy habits will help your body move past its craving for nicotine.   · Be prepared to keep trying. Most people are not successful the first few times they try to quit. Do not get mad at yourself if you smoke again. Make a list of things you learned and think about when you want to try again, such as next week, next month, or next year. Where can you learn more? Go to http://www.gray.com/  Enter [id number] in the search box to learn more about \"Stopping Smoking: Care Instructions. \"  Current as of: March 12, 2020               Content Version: 12.8  © 7942-0391 [employer]. Care instructions adapted under license by [employer] (which disclaims liability or warranty for this information). If you have questions about a medical condition or this instruction, always ask your healthcare professional. [address] any warranty or liability for your use of this information.

## 2021-08-13 DIAGNOSIS — E03.9 HYPOTHYROIDISM, UNSPECIFIED TYPE: ICD-10-CM

## 2021-08-13 DIAGNOSIS — I25.119 CORONARY ARTERY DISEASE INVOLVING NATIVE CORONARY ARTERY OF NATIVE HEART WITH ANGINA PECTORIS (HCC): ICD-10-CM

## 2021-08-13 DIAGNOSIS — Z11.59 NEED FOR HEPATITIS C SCREENING TEST: ICD-10-CM
